# Patient Record
Sex: MALE | Race: WHITE | NOT HISPANIC OR LATINO | Employment: OTHER | URBAN - NONMETROPOLITAN AREA
[De-identification: names, ages, dates, MRNs, and addresses within clinical notes are randomized per-mention and may not be internally consistent; named-entity substitution may affect disease eponyms.]

---

## 2017-02-08 ENCOUNTER — AMBULATORY - GICH (OUTPATIENT)
Dept: SCHEDULING | Facility: OTHER | Age: 72
End: 2017-02-08

## 2018-01-17 PROBLEM — E78.5 HYPERLIPIDEMIA: Status: ACTIVE | Noted: 2018-01-17

## 2018-01-17 PROBLEM — G43.909 MIGRAINE HEADACHE: Status: ACTIVE | Noted: 2018-01-17

## 2018-01-17 PROBLEM — I25.10 ATHEROSCLEROTIC HEART DISEASE: Status: ACTIVE | Noted: 2018-01-17

## 2018-01-17 RX ORDER — NITROGLYCERIN 0.4 MG/1
0.4 TABLET SUBLINGUAL EVERY 5 MIN PRN
COMMUNITY
Start: 2016-07-29 | End: 2020-09-21

## 2018-01-17 RX ORDER — METOPROLOL TARTRATE 25 MG/1
12.5 TABLET, FILM COATED ORAL DAILY
COMMUNITY
Start: 2016-10-14 | End: 2018-09-26

## 2018-01-17 RX ORDER — DIPHENOXYLATE HYDROCHLORIDE AND ATROPINE SULFATE 2.5; .025 MG/1; MG/1
1 TABLET ORAL DAILY
COMMUNITY

## 2018-01-17 RX ORDER — METOPROLOL SUCCINATE 50 MG/1
50 TABLET, EXTENDED RELEASE ORAL DAILY
COMMUNITY
Start: 2016-07-29 | End: 2018-07-11

## 2018-01-17 RX ORDER — ASPIRIN 81 MG/1
81 TABLET, CHEWABLE ORAL DAILY
COMMUNITY
Start: 2016-10-14

## 2018-01-17 RX ORDER — ACETAMINOPHEN 325 MG/1
650 TABLET ORAL EVERY 4 HOURS PRN
COMMUNITY

## 2018-01-17 RX ORDER — ATORVASTATIN CALCIUM 40 MG/1
40 TABLET, FILM COATED ORAL DAILY
COMMUNITY
Start: 2016-10-05 | End: 2020-09-21

## 2018-01-17 RX ORDER — FUROSEMIDE 40 MG
40 TABLET ORAL DAILY
COMMUNITY
Start: 2016-11-07 | End: 2018-07-11

## 2018-02-02 ENCOUNTER — DOCUMENTATION ONLY (OUTPATIENT)
Dept: FAMILY MEDICINE | Facility: OTHER | Age: 73
End: 2018-02-02

## 2018-02-09 ENCOUNTER — TRANSFERRED RECORDS (OUTPATIENT)
Dept: HEALTH INFORMATION MANAGEMENT | Facility: OTHER | Age: 73
End: 2018-02-09

## 2018-02-27 ENCOUNTER — TRANSFERRED RECORDS (OUTPATIENT)
Dept: HEALTH INFORMATION MANAGEMENT | Facility: OTHER | Age: 73
End: 2018-02-27

## 2018-06-08 ENCOUNTER — DOCUMENTATION ONLY (OUTPATIENT)
Dept: OTHER | Facility: CLINIC | Age: 73
End: 2018-06-08

## 2018-06-08 PROBLEM — Z71.89 ACP (ADVANCE CARE PLANNING): Chronic | Status: ACTIVE | Noted: 2018-06-08

## 2018-07-11 ENCOUNTER — OFFICE VISIT (OUTPATIENT)
Dept: FAMILY MEDICINE | Facility: OTHER | Age: 73
End: 2018-07-11
Attending: FAMILY MEDICINE
Payer: COMMERCIAL

## 2018-07-11 VITALS
HEART RATE: 56 BPM | DIASTOLIC BLOOD PRESSURE: 60 MMHG | WEIGHT: 152 LBS | TEMPERATURE: 96.4 F | SYSTOLIC BLOOD PRESSURE: 120 MMHG | BODY MASS INDEX: 22.51 KG/M2

## 2018-07-11 DIAGNOSIS — R05.9 COUGH: Primary | ICD-10-CM

## 2018-07-11 PROCEDURE — G0463 HOSPITAL OUTPT CLINIC VISIT: HCPCS

## 2018-07-11 PROCEDURE — 99213 OFFICE O/P EST LOW 20 MIN: CPT | Performed by: FAMILY MEDICINE

## 2018-07-11 RX ORDER — METOPROLOL SUCCINATE 25 MG/1
1 TABLET, EXTENDED RELEASE ORAL DAILY
COMMUNITY
Start: 2018-04-11

## 2018-07-11 RX ORDER — MULTIVITAMIN WITH IRON
1 TABLET ORAL DAILY
COMMUNITY

## 2018-07-11 RX ORDER — CODEINE PHOSPHATE AND GUAIFENESIN 10; 100 MG/5ML; MG/5ML
1 SOLUTION ORAL EVERY 4 HOURS PRN
Qty: 120 ML | Refills: 3 | Status: SHIPPED | OUTPATIENT
Start: 2018-07-11 | End: 2018-09-26

## 2018-07-11 ASSESSMENT — PAIN SCALES - GENERAL: PAINLEVEL: MILD PAIN (3)

## 2018-07-11 NOTE — MR AVS SNAPSHOT
"              After Visit Summary   2018    Darrell Peterson    MRN: [id number]           Patient Information     Date Of Birth          1945        Visit Information        Provider Department      2018 10:00 AM Dariusz Flowers MD [facility]        Today's Diagnoses     Cough    -  1       Follow-ups after your visit        Who to contact     If you have questions or need follow up information about today's clinic visit or your schedule please contact [facility] directly at [phone].  Normal or non-critical lab and imaging results will be communicated to you by MyChart, letter or phone within 4 business days after the clinic has received the results. If you do not hear from us within 7 days, please contact the clinic through [employer] or phone. If you have a critical or abnormal lab result, we will notify you by phone as soon as possible.  Submit refill requests through [employer] or call your pharmacy and they will forward the refill request to us. Please allow 3 business days for your refill to be completed.          Additional Information About Your Visit        MyChart Information     [employer] lets you send messages to your doctor, view your test results, renew your prescriptions, schedule appointments and more. To sign up, go to [email or web address] . Click on \"Log in\" on the left side of the screen, which will take you to the Welcome page. Then click on \"Sign up Now\" on the right side of the page.     You will be asked to enter the access code listed below, as well as some personal information. Please follow the directions to create your username and password.     Your access code is: [email or web address]  Expires: 10/10/2018 12:50 PM     Your access code will  in 90 days. If you need help or a new code, please call your [facility] or [phone].        Care EveryWhere ID     This is your Care EveryWhere ID. This could be used by other " organizations to access your [place] medical records  [id number]        Your Vitals Were     Pulse Temperature BMI (Body Mass Index)             56 96.4  F (35.8  C) 22.51 kg/m2          Blood Pressure from Last 3 Encounters:   07/11/18 120/60   11/07/16 94/60   10/24/16 92/58    Weight from Last 3 Encounters:   07/11/18 152 lb (68.9 kg)   11/07/16 151 lb (68.5 kg)   10/24/16 152 lb 6.4 oz (69.1 kg)              Today, you had the following     No orders found for display         Today's Medication Changes          These changes are accurate as of 7/11/18 11:59 PM.  If you have any questions, ask your nurse or doctor.               Start taking these medicines.        Dose/Directions    guaiFENesin-codeine 100-10 MG/5ML Soln solution   Commonly known as:  ROBITUSSIN AC   Used for:  Cough   Started by:  Dariusz Flowers MD        Dose:  1 tsp.   Take 5 mLs by mouth every 4 hours as needed for cough   Quantity:  120 mL   Refills:  3         These medicines have changed or have updated prescriptions.        Dose/Directions    metoprolol succinate 25 MG 24 hr tablet   Commonly known as:  TOPROL-XL   This may have changed:  Another medication with the same name was removed. Continue taking this medication, and follow the directions you see here.   Changed by:  Dariusz Flowers MD        Dose:  1 tablet   Take 1 tablet by mouth daily   Refills:  0         Stop taking these medicines if you haven't already. Please contact your care team if you have questions.     furosemide 40 MG tablet   Commonly known as:  LASIX   Stopped by:  Dariusz Flowers MD                Where to get your medicines      Some of these will need a paper prescription and others can be bought over the counter.  Ask your nurse if you have questions.     Bring a paper prescription for each of these medications     guaiFENesin-codeine 100-10 MG/5ML Soln solution                Primary Care Provider Office Phone # Fax #    Dariusz Flowers MD  [phone]       [address]   [facility] [address]        Equal Access to Services     SEPIDEH CROWELL : Hadii aad ku hadashelyzhane Jef, yajaira melvinamalu, sariah kakrish ro, xavier christianin hayaajoe felixpanda lozanotyler arredondo. So [facility] [phone].    ATENCIÓN: Si habla español, tiene a ruvalcaba disposición servicios gratuitos de asistencia lingüística. Llame al [phone].    We comply with applicable federal civil rights laws and [place] laws. We do not discriminate on the basis of race, color, national origin, age, disability, sex, sexual orientation, or gender identity.            Thank you!     Thank you for choosing [facility] AND [facility]  for your care. Our goal is always to provide you with excellent care. Hearing back from our patients is one way we can continue to improve our services. Please take a few minutes to complete the written survey that you may receive in the mail after your visit with us. Thank you!             Your Updated Medication List - Protect others around you: Learn how to safely use, store and throw away your medicines at www.disposemymeds.org.          This list is accurate as of 7/11/18 11:59 PM.  Always use your most recent med list.                   Brand Name Dispense Instructions for use Diagnosis    acetaminophen 325 MG tablet    TYLENOL     Take 650 mg by mouth every 4 hours as needed        aspirin 81 MG chewable tablet      Take 81 mg by mouth daily        atorvastatin 40 MG tablet    LIPITOR     Take 40 mg by mouth daily        guaiFENesin-codeine 100-10 MG/5ML Soln solution    ROBITUSSIN AC    120 mL    Take 5 mLs by mouth every 4 hours as needed for cough    Cough       magnesium 250 MG tablet      Take 1 tablet by mouth daily        metoprolol succinate 25 MG 24 hr tablet    TOPROL-XL     Take 1 tablet by mouth daily        metoprolol tartrate 25 MG tablet    LOPRESSOR     Take 12.5 mg by mouth daily        MULTI-VITAMINS Tabs      Take 1 tablet  by mouth daily        nitroGLYcerin 0.4 MG sublingual tablet    NITROSTAT     Place 0.4 mg under the tongue every 5 minutes as needed        RA VITAMIN B-12 TR 1000 MCG Tbcr   Generic drug:  cyanocobalamin      Take 1 tablet by mouth daily        Selenium 200 MCG Tbcr

## 2018-07-11 NOTE — NURSING NOTE
Patient here for cold, cough and chest congestion for the 9 days. No Suarez LPN .......................7/11/2018  10:13 AM

## 2018-07-12 ASSESSMENT — ENCOUNTER SYMPTOMS: COUGH: 1

## 2018-07-12 NOTE — PROGRESS NOTES
SUBJECTIVE:   Darrell Peterson is a 73 year old male who presents to clinic today for the following health issues: Cough congestion    HPI Comments: Patient arrives here for cough and congestion.  He reports that he is on day 9.  Is been using cough drops Vicks essential oils all without any improvement although reports that the cough is now nonproductive and has become more dry.  He has felt warm but no overt fevers and chills.    Cough           Patient Active Problem List    Diagnosis Date Noted     Cough 07/11/2018     Priority: Medium     ACP (advance care planning) 06/08/2018     Priority: Medium     Atherosclerotic heart disease 01/17/2018     Priority: Medium     Hyperlipidemia 01/17/2018     Priority: Medium     Migraine headache 01/17/2018     Priority: Medium     Chronic systolic congestive heart failure (H) 11/07/2016     Priority: Medium     Coronary artery disease involving coronary bypass graft of native heart without angina pectoris 10/26/2016     Priority: Medium     Overview:   Three-vessel       Pleural effusion 10/26/2016     Priority: Medium     Cerebral infarction (H) 02/21/2014     Priority: Medium     Slowing of urinary stream 02/21/2014     Priority: Medium     Past Medical History:   Diagnosis Date     Atherosclerotic heart disease of native coronary artery without angina pectoris     1996,Stents times 3     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      Past Surgical History:   Procedure Laterality Date     ANGIOPLASTY      9/96, 1996~1997,and angioplasty     COLONOSCOPY      6/4/09     COLONOSCOPY      1997 Nov     HEART CATH, ANGIOPLASTY      9/96, 1996~1997,x3     HEART CATH, ANGIOPLASTY      1996     OTHER SURGICAL HISTORY      9/96, 1996~1997,[id number],SCAN-ANGIOGRAM,Coronary angiography     OTHER SURGICAL HISTORY      1996,[id number],SCAN-ANGIOGRAM,Coronary arteriography, follow-up angiography in Nov     OTHER SURGICAL HISTORY      10/10/2016,[id number],OTHER,N/A,[facility]      VASECTOMY      1982     Current Outpatient Prescriptions   Medication Sig Dispense Refill     acetaminophen (TYLENOL) 325 MG tablet Take 650 mg by mouth every 4 hours as needed       aspirin 81 MG chewable tablet Take 81 mg by mouth daily       atorvastatin (LIPITOR) 40 MG tablet Take 40 mg by mouth daily       cyanocobalamin (RA VITAMIN B-12 TR) 1000 MCG TBCR Take 1 tablet by mouth daily       guaiFENesin-codeine (ROBITUSSIN AC) 100-10 MG/5ML SOLN solution Take 5 mLs by mouth every 4 hours as needed for cough 120 mL 3     magnesium 250 MG tablet Take 1 tablet by mouth daily       metoprolol succinate (TOPROL-XL) 25 MG 24 hr tablet Take 1 tablet by mouth daily       metoprolol tartrate (LOPRESSOR) 25 MG tablet Take 12.5 mg by mouth daily       Multiple Vitamin (MULTI-VITAMINS) TABS Take 1 tablet by mouth daily       nitroGLYcerin (NITROSTAT) 0.4 MG sublingual tablet Place 0.4 mg under the tongue every 5 minutes as needed       Selenium 200 MCG TBCR        No Known Allergies    Review of Systems   Respiratory: Positive for cough.         OBJECTIVE:     /60 (BP Location: Right arm, Patient Position: Sitting)  Pulse 56  Temp 96.4  F (35.8  C)  Wt 152 lb (68.9 kg)  BMI 22.51 kg/m2  Body mass index is 22.51 kg/(m^2).  Physical Exam   Constitutional: He appears well-developed.   HENT:   Right Ear: External ear normal.   Left Ear: External ear normal.   Mouth/Throat: No oropharyngeal exudate.   Cardiovascular: Normal rate and normal heart sounds.    Pulmonary/Chest: Effort normal and breath sounds normal.       none     ASSESSMENT/PLAN:         1. Cough  I suspect that this cough is viral in origin.  Recommended observation through the weekend.  If no improvement call for an antibiotic.  Will start Robitussin.  - guaiFENesin-codeine (ROBITUSSIN AC) 100-10 MG/5ML SOLN solution; Take 5 mLs by mouth every 4 hours as needed for cough  Dispense: 120 mL; Refill: 3      Dariusz Flowers MD  [facility] [place] AND  [facility]

## 2018-07-18 ENCOUNTER — TELEPHONE (OUTPATIENT)
Dept: FAMILY MEDICINE | Facility: OTHER | Age: 73
End: 2018-07-18

## 2018-07-18 DIAGNOSIS — R05.9 COUGH: Primary | ICD-10-CM

## 2018-07-18 RX ORDER — AMOXICILLIN 500 MG/1
500 CAPSULE ORAL 3 TIMES DAILY
Qty: 30 CAPSULE | Refills: 0 | Status: SHIPPED | OUTPATIENT
Start: 2018-07-18 | End: 2018-09-26

## 2018-07-18 NOTE — TELEPHONE ENCOUNTER
Patient was seen on 0711/18, was told to call if not better for antibiotic. No Suarez LPN .......................7/18/2018  11:46 AM

## 2018-09-26 ENCOUNTER — OFFICE VISIT (OUTPATIENT)
Dept: FAMILY MEDICINE | Facility: OTHER | Age: 73
End: 2018-09-26
Attending: FAMILY MEDICINE
Payer: COMMERCIAL

## 2018-09-26 VITALS
HEART RATE: 60 BPM | DIASTOLIC BLOOD PRESSURE: 70 MMHG | HEIGHT: 70 IN | SYSTOLIC BLOOD PRESSURE: 138 MMHG | WEIGHT: 152.6 LBS | BODY MASS INDEX: 21.85 KG/M2 | TEMPERATURE: 97.2 F

## 2018-09-26 DIAGNOSIS — M54.42 ACUTE BILATERAL LOW BACK PAIN WITH LEFT-SIDED SCIATICA: Primary | ICD-10-CM

## 2018-09-26 DIAGNOSIS — I25.810 CORONARY ARTERY DISEASE INVOLVING CORONARY BYPASS GRAFT OF NATIVE HEART WITHOUT ANGINA PECTORIS: ICD-10-CM

## 2018-09-26 DIAGNOSIS — E78.5 HYPERLIPIDEMIA, UNSPECIFIED HYPERLIPIDEMIA TYPE: ICD-10-CM

## 2018-09-26 PROBLEM — R05.9 COUGH: Status: RESOLVED | Noted: 2018-07-11 | Resolved: 2018-09-26

## 2018-09-26 PROBLEM — M54.40 ACUTE BILATERAL LOW BACK PAIN WITH SCIATICA: Status: ACTIVE | Noted: 2018-09-26

## 2018-09-26 PROCEDURE — 99213 OFFICE O/P EST LOW 20 MIN: CPT | Performed by: FAMILY MEDICINE

## 2018-09-26 PROCEDURE — G0463 HOSPITAL OUTPT CLINIC VISIT: HCPCS

## 2018-09-26 RX ORDER — ETODOLAC 500 MG
500 TABLET ORAL 2 TIMES DAILY
Qty: 20 TABLET | Refills: 0 | Status: SHIPPED | OUTPATIENT
Start: 2018-09-26 | End: 2018-10-10

## 2018-09-26 ASSESSMENT — ENCOUNTER SYMPTOMS: BACK PAIN: 1

## 2018-09-26 ASSESSMENT — PAIN SCALES - GENERAL: PAINLEVEL: MODERATE PAIN (5)

## 2018-09-26 NOTE — MR AVS SNAPSHOT
"              After Visit Summary   9/26/2018    Darrell Peterson    MRN: [id number]           Patient Information     Date Of Birth          1945        Visit Information        Provider Department      9/26/2018 8:15 AM Dariusz Flowers MD [facility]        Today's Diagnoses     Acute bilateral low back pain with left-sided sciatica    -  1    Coronary artery disease involving coronary bypass graft of native heart without angina pectoris        Hyperlipidemia, unspecified hyperlipidemia type           Follow-ups after your visit        Future tests that were ordered for you today     Open Future Orders        Priority Expected Expires Ordered    XR Lumbar Spine 2/3 Views Routine 10/3/2018 9/26/2019 9/26/2018            Who to contact     If you have questions or need follow up information about today's clinic visit or your schedule please contact [facility] [place] directly at [phone].  Normal or non-critical lab and imaging results will be communicated to you by MyChart, letter or phone within 4 business days after the clinic has received the results. If you do not hear from us within 7 days, please contact the clinic through MyChart or phone. If you have a critical or abnormal lab result, we will notify you by phone as soon as possible.  Submit refill requests through [employer] or call your pharmacy and they will forward the refill request to us. Please allow 3 business days for your refill to be completed.          Additional Information About Your Visit        Care EveryWhere ID     This is your Care EveryWhere ID. This could be used by other organizations to access your [place] medical records  [id number]        Your Vitals Were     Pulse Temperature Height BMI (Body Mass Index)          60 97.2  F (36.2  C) (Tympanic) 5' 9.75\" (1.772 m) 22.05 kg/m2         Blood Pressure from Last 3 Encounters:   09/26/18 138/70   07/11/18 120/60   11/07/16 94/60    Weight from " Last 3 Encounters:   09/26/18 152 lb 9.6 oz (69.2 kg)   07/11/18 152 lb (68.9 kg)   11/07/16 151 lb (68.5 kg)                 Today's Medication Changes          These changes are accurate as of 9/26/18 12:10 PM.  If you have any questions, ask your nurse or doctor.               Start taking these medicines.        Dose/Directions    etodolac 500 MG tablet   Commonly known as:  LODINE   Used for:  Acute bilateral low back pain with left-sided sciatica   Started by:  Dariusz Flowers MD        Dose:  500 mg   Take 1 tablet (500 mg) by mouth 2 times daily   Quantity:  20 tablet   Refills:  0            Where to get your medicines      These medications were sent to [facility] - [place] - [address]  [address] [place] [address]     Phone:  [phone]     etodolac 500 MG tablet                Primary Care Provider Office Phone # Fax #    Dariusz Flowers [phone]       [address] [facility] [address]        Equal Access to Services     [facility]: Hadii katie ku hadasho Soriddhi, waaxda luqadaha, qaybta kaalmada jia, xavier cannon . So [facility] [phone].    ATENCIÓN: Si habla español, tiene a ruvalcaba disposición servicios gratuitos de asistencia lingüística. LlSt. [facility] [phone].    We comply with applicable federal civil rights laws and [place] laws. We do not discriminate on the basis of race, color, national origin, age, disability, sex, sexual orientation, or gender identity.            Thank you!     Thank you for choosing [facility] AND [facility]  for your care. Our goal is always to provide you with excellent care. Hearing back from our patients is one way we can continue to improve our services. Please take a few minutes to complete the written survey that you may receive in the mail after your visit with us. Thank you!             Your Updated Medication List - Protect others  around you: Learn how to safely use, store and throw away your medicines at www.disposemymeds.org.          This list is accurate as of 9/26/18 12:10 PM.  Always use your most recent med list.                   Brand Name Dispense Instructions for use Diagnosis    acetaminophen 325 MG tablet    TYLENOL     Take 650 mg by mouth every 4 hours as needed        aspirin 81 MG chewable tablet      Take 81 mg by mouth daily        atorvastatin 40 MG tablet    LIPITOR     Take 40 mg by mouth daily        etodolac 500 MG tablet    LODINE    20 tablet    Take 1 tablet (500 mg) by mouth 2 times daily    Acute bilateral low back pain with left-sided sciatica       magnesium 250 MG tablet      Take 1 tablet by mouth daily        metoprolol succinate 25 MG 24 hr tablet    TOPROL-XL     Take 1 tablet by mouth daily        MULTI-VITAMINS Tabs      Take 1 tablet by mouth daily        nitroGLYcerin 0.4 MG sublingual tablet    NITROSTAT     Place 0.4 mg under the tongue every 5 minutes as needed        RA VITAMIN B-12 TR 1000 MCG Tbcr   Generic drug:  cyanocobalamin      Take 1 tablet by mouth daily        Selenium 200 MCG Tbcr

## 2018-09-26 NOTE — NURSING NOTE
"Patient presents in the clinic with concerns of a low back pain that radiates down his left leg, that began around 2 months ago. Patient denies any injury, but believes it is caused from overworking his back. Patient would also like lab work today, and is fasting.  Rosibel Sr LPN 9/26/2018 8:16 AM  Chief Complaint   Patient presents with     Back Pain       Initial /70 (BP Location: Right arm, Patient Position: Sitting, Cuff Size: Adult Regular)  Pulse 60  Temp 97.2  F (36.2  C) (Tympanic)  Ht 5' 9.75\" (1.772 m)  Wt 152 lb 9.6 oz (69.2 kg)  BMI 22.05 kg/m2 Estimated body mass index is 22.05 kg/(m^2) as calculated from the following:    Height as of this encounter: 5' 9.75\" (1.772 m).    Weight as of this encounter: 152 lb 9.6 oz (69.2 kg).  Medication Reconciliation: complete    Joseline Sr LPN    "

## 2018-10-02 ENCOUNTER — TELEPHONE (OUTPATIENT)
Dept: FAMILY MEDICINE | Facility: OTHER | Age: 73
End: 2018-10-02

## 2018-10-04 NOTE — TELEPHONE ENCOUNTER
Patients EC was inquiring about his last visit, unfortunately he has night signed a form stating they have access. She was made aware, he can fill one out at [facility].    Claudia King LPN on 10/4/2018 at 9:05 AM

## 2018-10-10 ENCOUNTER — HOSPITAL ENCOUNTER (OUTPATIENT)
Dept: GENERAL RADIOLOGY | Facility: OTHER | Age: 73
Discharge: HOME | End: 2018-10-10
Attending: FAMILY MEDICINE | Admitting: FAMILY MEDICINE
Payer: COMMERCIAL

## 2018-10-10 ENCOUNTER — OFFICE VISIT (OUTPATIENT)
Dept: FAMILY MEDICINE | Facility: OTHER | Age: 73
End: 2018-10-10
Attending: FAMILY MEDICINE
Payer: COMMERCIAL

## 2018-10-10 VITALS
HEART RATE: 56 BPM | DIASTOLIC BLOOD PRESSURE: 64 MMHG | SYSTOLIC BLOOD PRESSURE: 130 MMHG | WEIGHT: 151 LBS | BODY MASS INDEX: 21.82 KG/M2

## 2018-10-10 DIAGNOSIS — M54.42 ACUTE BILATERAL LOW BACK PAIN WITH LEFT-SIDED SCIATICA: ICD-10-CM

## 2018-10-10 DIAGNOSIS — M54.42 ACUTE BILATERAL LOW BACK PAIN WITH LEFT-SIDED SCIATICA: Primary | ICD-10-CM

## 2018-10-10 PROCEDURE — 99213 OFFICE O/P EST LOW 20 MIN: CPT | Performed by: FAMILY MEDICINE

## 2018-10-10 PROCEDURE — G0463 HOSPITAL OUTPT CLINIC VISIT: HCPCS

## 2018-10-10 PROCEDURE — G0463 HOSPITAL OUTPT CLINIC VISIT: HCPCS | Mod: 25

## 2018-10-10 PROCEDURE — 72100 X-RAY EXAM L-S SPINE 2/3 VWS: CPT

## 2018-10-10 ASSESSMENT — PAIN SCALES - GENERAL: PAINLEVEL: MODERATE PAIN (5)

## 2018-10-10 NOTE — PROGRESS NOTES
SUBJECTIVE:   Darrell Peterson is a 73 year old male who presents to clinic today for the following health issues: Follow leg pain    HPI Comments: Patient arrives here for follow-up left leg pain.  Spin going about 8 weeks.  Seems to have stabilized.  He continues to go to chiropractic care but also is wondering about pursuing physical therapy.  Starts in the buttocks goes down the back of the leg to the side.  He also reports his metatarsal pads are hurting.  Rates it at times 5 out of 10.  No changes in bowel or bladder habits.  He does report a little leg weakness.  States he walks with a limp    Patient would also like a PSA done.    Musculoskeletal Problem           Patient Active Problem List    Diagnosis Date Noted     Acute bilateral low back pain with sciatica 09/26/2018     Priority: Medium     ACP (advance care planning) 06/08/2018     Priority: Medium     Atherosclerotic heart disease 01/17/2018     Priority: Medium     Hyperlipidemia 01/17/2018     Priority: Medium     Migraine headache 01/17/2018     Priority: Medium     Chronic systolic congestive heart failure (H) 11/07/2016     Priority: Medium     Coronary artery disease involving coronary bypass graft of native heart without angina pectoris 10/26/2016     Priority: Medium     Overview:   Three-vessel       Cerebral infarction (H) 02/21/2014     Priority: Medium     Slowing of urinary stream 02/21/2014     Priority: Medium     Past Medical History:   Diagnosis Date     Atherosclerotic heart disease of native coronary artery without angina pectoris     1996,Stents times 3     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      Past Surgical History:   Procedure Laterality Date     ANGIOPLASTY      9/96, 1996~1997,and angioplasty     COLONOSCOPY      6/4/09     COLONOSCOPY      1997 Nov     HEART CATH, ANGIOPLASTY      9/96, 1996~1997,x3     HEART CATH, ANGIOPLASTY      1996     OTHER SURGICAL HISTORY      9/96,  1996~1997,[id number],SCAN-ANGIOGRAM,Coronary angiography     OTHER SURGICAL HISTORY      1996,[id number],SCAN-ANGIOGRAM,Coronary arteriography, follow-up angiography in Nov     OTHER SURGICAL HISTORY      10/10/2016,[id number],OTHER,N/A,[facility]     VASECTOMY      1982     Current Outpatient Prescriptions   Medication Sig Dispense Refill     acetaminophen (TYLENOL) 325 MG tablet Take 650 mg by mouth every 4 hours as needed       aspirin 81 MG chewable tablet Take 81 mg by mouth daily       atorvastatin (LIPITOR) 40 MG tablet Take 40 mg by mouth daily       cyanocobalamin (RA VITAMIN B-12 TR) 1000 MCG TBCR Take 1 tablet by mouth daily       magnesium 250 MG tablet Take 1 tablet by mouth daily       metoprolol succinate (TOPROL-XL) 25 MG 24 hr tablet Take 1 tablet by mouth daily       Multiple Vitamin (MULTI-VITAMINS) TABS Take 1 tablet by mouth daily       nitroGLYcerin (NITROSTAT) 0.4 MG sublingual tablet Place 0.4 mg under the tongue every 5 minutes as needed       Selenium 200 MCG TBCR        No Known Allergies    Review of Systems     OBJECTIVE:     /64 (BP Location: Right arm, Patient Position: Sitting)  Pulse 56  Wt 151 lb (68.5 kg)  BMI 21.82 kg/m2  Body mass index is 21.82 kg/(m^2).  Physical Exam   Constitutional: He appears well-developed.   Neck: Normal range of motion.   Pulmonary/Chest: Effort normal.   Genitourinary:   Genitourinary Comments: Declines prostate exam   Neurological: He is alert.   Psychiatric: He has a normal mood and affect.     X-rays show some degenerative changes  none     ASSESSMENT/PLAN:         1. Acute bilateral low back pain with left-sided sciatica  Patient probably has sciatica going down the left leg.  He has not started his Lodine.  We discussed further options including injections.  Possibly prednisone.  Possibly further studying including MRI.  But at this time patient does not wish to pursue injections.  We will continue to be conservative.  Physical f therapy  ordered.  Also went into an extensive conversation with his wife concerning PSA  And the difficulty with false positives.  At this time they do not want to pursue it.  - XR Lumbar Spine 2/3 Views; Future  - PHYSICAL THERAPY REFERRAL; Future      Dariusz Flowers MD  [facility]

## 2018-10-10 NOTE — MR AVS SNAPSHOT
After Visit Summary   10/10/2018    Darrell Peterson    MRN: [id number]           Patient Information     Date Of Birth          1945        Visit Information        Provider Department      10/10/2018 8:30 AM Dariusz Flowers MD [facility]        Today's Diagnoses     Acute bilateral low back pain with left-sided sciatica    -  1       Follow-ups after your visit        Additional Services     PHYSICAL THERAPY REFERRAL       Pt will call  If he decides to start phys therapy                  Future tests that were ordered for you today     Open Future Orders        Priority Expected Expires Ordered    PHYSICAL THERAPY REFERRAL Routine  10/10/2019 10/10/2018    XR Lumbar Spine 2/3 Views Routine 10/10/2018 10/10/2019 10/10/2018            Who to contact     If you have questions or need follow up information about today's clinic visit or your schedule please contact [facility] directly at [phone].  Normal or non-critical lab and imaging results will be communicated to you by MyChart, letter or phone within 4 business days after the clinic has received the results. If you do not hear from us within 7 days, please contact the clinic through MyChart or phone. If you have a critical or abnormal lab result, we will notify you by phone as soon as possible.  Submit refill requests through "[employer] or call your pharmacy and they will forward the refill request to us. Please allow 3 business days for your refill to be completed.          Additional Information About Your Visit        Care EveryWhere ID     This is your Care EveryWhere ID. This could be used by other organizations to access your [facility] medical records  [id number]        Your Vitals Were     Pulse BMI (Body Mass Index)                56 21.82 kg/m2           Blood Pressure from Last 3 Encounters:   10/10/18 130/64   09/26/18 138/70   07/11/18 120/60    Weight from Last 3 Encounters:   10/10/18  151 lb (68.5 kg)   09/26/18 152 lb 9.6 oz (69.2 kg)   07/11/18 152 lb (68.9 kg)               Primary Care Provider Office Phone # Fax #    Dariusz Flowers [phone] [address]  [facility] [id number]        Equal Access to Services     [facility]: Hadii aad ku hadasho Soomaali, waaxda luqadaha, qaybta kaalmada adeegyada, waxay idiin hayaan adeeg martatyler lasheryln . So [facility] [phone].    ATENCIÓN: Si habla español, tiene a ruvalcaba disposición servicios gratuitos de asistencia lingüística. Saviame al [phone].    We comply with applicable federal civil rights laws and [place] laws. We do not discriminate on the basis of race, color, national origin, age, disability, sex, sexual orientation, or gender identity.            Thank you!     Thank you for choosing [facility] AND [facility]  for your care. Our goal is always to provide you with excellent care. Hearing back from our patients is one way we can continue to improve our services. Please take a few minutes to complete the written survey that you may receive in the mail after your visit with us. Thank you!             Your Updated Medication List - Protect others around you: Learn how to safely use, store and throw away your medicines at www.disposemymeds.org.          This list is accurate as of 10/10/18  2:04 PM.  Always use your most recent med list.                   Brand Name Dispense Instructions for use Diagnosis    acetaminophen 325 MG tablet    TYLENOL     Take 650 mg by mouth every 4 hours as needed        aspirin 81 MG chewable tablet      Take 81 mg by mouth daily        atorvastatin 40 MG tablet    LIPITOR     Take 40 mg by mouth daily        magnesium 250 MG tablet      Take 1 tablet by mouth daily        metoprolol succinate 25 MG 24 hr tablet    TOPROL-XL     Take 1 tablet by mouth daily        MULTI-VITAMINS Tabs      Take 1 tablet by mouth daily        nitroGLYcerin 0.4 MG sublingual tablet     NITROSTAT     Place 0.4 mg under the tongue every 5 minutes as needed        RA VITAMIN B-12 TR 1000 MCG Tbcr   Generic drug:  cyanocobalamin      Take 1 tablet by mouth daily        Selenium 200 MCG Tbcr

## 2019-02-20 ENCOUNTER — OFFICE VISIT (OUTPATIENT)
Dept: FAMILY MEDICINE | Facility: OTHER | Age: 74
End: 2019-02-20
Attending: FAMILY MEDICINE
Payer: COMMERCIAL

## 2019-02-20 VITALS
WEIGHT: 157 LBS | TEMPERATURE: 97.7 F | SYSTOLIC BLOOD PRESSURE: 130 MMHG | BODY MASS INDEX: 22.48 KG/M2 | OXYGEN SATURATION: 98 % | HEIGHT: 70 IN | HEART RATE: 75 BPM | DIASTOLIC BLOOD PRESSURE: 70 MMHG

## 2019-02-20 DIAGNOSIS — I25.10 ATHEROSCLEROSIS OF NATIVE CORONARY ARTERY OF NATIVE HEART WITHOUT ANGINA PECTORIS: ICD-10-CM

## 2019-02-20 DIAGNOSIS — Z01.818 PREOPERATIVE EXAMINATION: Primary | ICD-10-CM

## 2019-02-20 DIAGNOSIS — H25.13 AGE-RELATED NUCLEAR CATARACT OF BOTH EYES: ICD-10-CM

## 2019-02-20 PROBLEM — H25.10 AGE-RELATED NUCLEAR CATARACT: Status: ACTIVE | Noted: 2019-02-20

## 2019-02-20 LAB
ANION GAP SERPL CALCULATED.3IONS-SCNC: 6 MMOL/L (ref 3–14)
BUN SERPL-MCNC: 18 MG/DL (ref 7–25)
CALCIUM SERPL-MCNC: 9.1 MG/DL (ref 8.6–10.3)
CHLORIDE SERPL-SCNC: 105 MMOL/L (ref 98–107)
CO2 SERPL-SCNC: 30 MMOL/L (ref 21–31)
CREAT SERPL-MCNC: 1.05 MG/DL (ref 0.7–1.3)
GFR SERPL CREATININE-BSD FRML MDRD: 69 ML/MIN/{1.73_M2}
GLUCOSE SERPL-MCNC: 87 MG/DL (ref 70–105)
POTASSIUM SERPL-SCNC: 4.3 MMOL/L (ref 3.5–5.1)
SODIUM SERPL-SCNC: 141 MMOL/L (ref 134–144)

## 2019-02-20 PROCEDURE — 99214 OFFICE O/P EST MOD 30 MIN: CPT | Performed by: FAMILY MEDICINE

## 2019-02-20 PROCEDURE — 36415 COLL VENOUS BLD VENIPUNCTURE: CPT | Performed by: FAMILY MEDICINE

## 2019-02-20 PROCEDURE — G0463 HOSPITAL OUTPT CLINIC VISIT: HCPCS | Mod: 25

## 2019-02-20 PROCEDURE — 93010 ELECTROCARDIOGRAM REPORT: CPT | Performed by: INTERNAL MEDICINE

## 2019-02-20 PROCEDURE — 80048 BASIC METABOLIC PNL TOTAL CA: CPT | Performed by: FAMILY MEDICINE

## 2019-02-20 PROCEDURE — 93005 ELECTROCARDIOGRAM TRACING: CPT | Performed by: FAMILY MEDICINE

## 2019-02-20 ASSESSMENT — MIFFLIN-ST. JEOR: SCORE: 1454.43

## 2019-02-20 NOTE — PROGRESS NOTES
"----------------- PREOPERATIVE EXAM ------------------  2/20/2019    SUBJECTIVE:  Darrell Peterson is a 73 year old male here for preoperative optimization.    I was asked to see Darrell Peterson by Dr. Agrawal  for preoperative evaluation    Date of Surgery: 03/05 and 03/20  Type of Surgery: [facility]:  [facility]    HPI:  Patient arrives here for preop.  He will be undergoing left cataract surgery March 5 and right cataract surgery March 20.  Patient reports 2-year history of trouble seeing especially at night.  He states driving is getting more more complicated.  Patient does have a history of coronary artery disease and bypass graft but states he exercises regularly.  He has no difficulty with lifting weights sit ups push-ups on a regular basis.  No complaints of chest pain.      Nursing Notes:   No Abebe LPN  2/20/2019  2:33 PM  Sign at exiting of workspace  Date of Surgery: 3/5/19 and 3/20/19   Type of Surgery: cataract removal  Surgeon: [facility]   Hospital:  [place]  Fax:     Fever/Chills or other infectious symptoms in past month: no  >10lb weight loss in past two months: no      Health Care Directive/Code status:  YES  Hx of blood transfusions:   yes  Td up to date:  no  History of VRE/MRSA:  no Date:     Preoperative Evaluation: Obstructive Sleep Apnea screening    S: Snore -  Do you snore loudly? (louder than talking or loud enough to be heard through closed doors)yes  T: Tired - Do you often feel tired, fatigued, or sleepy during the daytime?no  O: Observed - Has anyone ever observed you stop breathing during your sleep?no  P: Pressure - Do you have or are you being treated for high blood pressure?no  B: BMI - BMI greater than 35kg/m2?no  A: Age - Age over 50 years old?yes  N: Neck - Neck circumference greater than 40 cm?no  G: Gender - Gender: Male?yes    Total number of \"YES\" responses:  2    Scoring: Low risk of OJSÉ MANUEL 0-2  At Risk of JOSÉ MANUEL: >3 High Risk of JOSÉ MANUEL: 5-8    No LMP for male " patient.  Medication Reconciliation: complete    No Abebe LPN  2019 2:24 PM        Patient Active Problem List    Diagnosis Date Noted     Age-related nuclear cataract 2019     Priority: Medium     Acute bilateral low back pain with sciatica 2018     Priority: Medium     ACP (advance care planning) 2018     Priority: Medium     Atherosclerotic heart disease 2018     Priority: Medium     Hyperlipidemia 2018     Priority: Medium     Migraine headache 2018     Priority: Medium     Chronic systolic congestive heart failure (H) 2016     Priority: Medium     Coronary artery disease involving coronary bypass graft of native heart without angina pectoris 10/26/2016     Priority: Medium     Overview:   Three-vessel       Cerebral infarction (H) 2014     Priority: Medium     Slowing of urinary stream 2014     Priority: Medium       Past Medical History:   Diagnosis Date     Atherosclerotic heart disease of native coronary artery without angina pectoris     ,Stents times 3     Other specified arthropod-borne viral fevers     2011       Past Surgical History:   Procedure Laterality Date     ANGIOPLASTY      , ~,and angioplasty     COLONOSCOPY  2009     COLONOSCOPY       Nov     HEART CATH, ANGIOPLASTY      , ,x3     HEART CATH, ANGIOPLASTY           OTHER SURGICAL HISTORY      , ,[id number],SCAN-ANGIOGRAM,Coronary angiography     OTHER SURGICAL HISTORY      ,2074,SCAN-ANGIOGRAM,Coronary arteriography, follow-up angiography in Nov     OTHER SURGICAL HISTORY      10/10/2016,[id number],OTHER,N/A,[facility]     VASECTOMY      1982       Family History   Problem Relation Age of Onset     Other - See Comments Mother          at 99     Other - See Comments Father         COPD, hx of smoking     Other - See Comments Brother          of stabbing     Other - See Comments Brother         no  "heart problems or hyperlipidemia     Other - See Comments Sister      Hyperlipidemia Sister         Hyperlipidemia,70 yrs old, hyperlipidemia     Heart Disease Other         Heart Disease,MI       Social History     Tobacco Use     Smoking status: Former Smoker     Types: Cigarettes     Last attempt to quit: 1977     Years since quittin.0     Smokeless tobacco: Never Used   Substance Use Topics     Alcohol use: No     Alcohol/week: 0.0 oz     Drug use: No       Current Outpatient Medications   Medication Sig Dispense Refill     acetaminophen (TYLENOL) 325 MG tablet Take 650 mg by mouth every 4 hours as needed       aspirin 81 MG chewable tablet Take 81 mg by mouth daily       atorvastatin (LIPITOR) 40 MG tablet Take 40 mg by mouth daily       cyanocobalamin (RA VITAMIN B-12 TR) 1000 MCG TBCR Take 1 tablet by mouth daily       magnesium 250 MG tablet Take 1 tablet by mouth daily       metoprolol succinate (TOPROL-XL) 25 MG 24 hr tablet Take 1 tablet by mouth daily       Multiple Vitamin (MULTI-VITAMINS) TABS Take 1 tablet by mouth daily       nitroGLYcerin (NITROSTAT) 0.4 MG sublingual tablet Place 0.4 mg under the tongue every 5 minutes as needed       Selenium 200 MCG TBCR          Allergies:  No Known Allergies    ROS:    Surgical:  patient denies previous complications from prior surgeries including but not limited to prolonged bleeding, anesthesia complications, dysrhythmias, surgical wound infections, or prolonged hospital stay.    Denies family hx of bleeding tendencies, anesthesia complications, or other problems with surgery.  For complete review of systems please see scanned reports    For complete review of systems please see scanned reports   -------------------------------------------------------------    PHYSICAL EXAM:  /70 (BP Location: Right arm, Patient Position: Sitting, Cuff Size: Adult Regular)   Pulse 75   Temp 97.7  F (36.5  C)   Ht 1.772 m (5' 9.75\")   Wt 71.2 kg (157 lb)   " SpO2 98%   BMI 22.69 kg/m      EXAM:  General Appearance: Pleasant, alert, appropriate appearance for age. No acute distress  Head Exam: Normal. Normocephalic, atraumatic.  Eyes: PERRL, EOMI  Ears: Normal TM's bilaterally. Normal auditory canals and external ears.   OroPharynx: Normal buccal mucosa. Normal pharynx.  Neck: Supple, no masses or nodes, no lymphadenopathy.  No thyromegaly.  Lungs: Normal chest wall and respirations. Clear to auscultation, no wheezes or crackles.  Cardiovascular: Regular rate and rhythm. S1, S2, no murmurs.  Gastrointestinal: Soft, nontender, no abnormal masses or organomegaly. BS normal   Musculoskeletal: No edema.  Skin: no concerning or new rashes.  Neurologic Exam: CN 2-12 grossly intact.  Normal gait.  Symmetric DTRs, normal gross motor movement, tone, and coordination. No tremor.  Psychiatric Exam: Alert and oriented, appropriate affect.      EKG:  sinus bradycardia with marked changes in the lateral leads no recent EKGs except from 2016 prior to his CABG .  ---------------------------------------------------------------  LABS    ASSESSEMENT AND PLAN:    (Z01.818) Preoperative examination  (primary encounter diagnosis)  Patient is medically cleared to proceed with surgery    (I25.10) Atherosclerosis of native coronary artery of native heart without angina pectoris  Currently no symptoms of angina EKG changes likely due to CABG    (H25.13) Age-related nuclear cataract of both eyes        PRE OP RECOMMENDATIONS:  Patient is on chronic pain medications no   Patient is on antiplatlet/anticoagulation medication asa stop one week pror  Other medications that need adjustment perioperatively none     Other:  Patient was advised to call our office and the surgical services with any change in condition or new symptoms if they were to develop between today and their surgical date.  Especially any cardiopulmonary symptoms or symptoms concerning for an infection.    Dariusz Flowers  2/20/2019

## 2019-02-20 NOTE — LETTER
February 22, 2019      Darrell DAVID Nicholas  [address]   [place] [address]        Dear ,    We are writing to inform you of your test results.    Your test results fall within the expected range(s) or remain unchanged from previous results.  Please continue with current treatment plan.    Resulted Orders   Basic Metabolic Panel   Result Value Ref Range    Sodium 141 134 - 144 mmol/L    Potassium 4.3 3.5 - 5.1 mmol/L    Chloride 105 98 - 107 mmol/L    Carbon Dioxide 30 21 - 31 mmol/L    Anion Gap 6 3 - 14 mmol/L    Glucose 87 70 - 105 mg/dL    Urea Nitrogen 18 7 - 25 mg/dL    Creatinine 1.05 0.70 - 1.30 mg/dL    GFR Estimate 69 >60 mL/min/[1.73_m2]    GFR Estimate If Black 84 >60 mL/min/[1.73_m2]    Calcium 9.1 8.6 - 10.3 mg/dL       If you have any questions or concerns, please call the clinic at the number listed above.       Sincerely,        Dariusz Flowers MD

## 2019-02-20 NOTE — NURSING NOTE
"Date of Surgery: 3/5/19 and 3/20/19   Type of Surgery: cataract removal  Surgeon: [facility]:  [place]  Fax:     Fever/Chills or other infectious symptoms in past month: no  >10lb weight loss in past two months: no  O2 SAT: 98    Health Care Directive/Code status:  YES  Hx of blood transfusions:   yes  Td up to date:  no  History of VRE/MRSA:  no Date:     Preoperative Evaluation: Obstructive Sleep Apnea screening    S: Snore -  Do you snore loudly? (louder than talking or loud enough to be heard through closed doors)yes  T: Tired - Do you often feel tired, fatigued, or sleepy during the daytime?no  O: Observed - Has anyone ever observed you stop breathing during your sleep?no  P: Pressure - Do you have or are you being treated for high blood pressure?no  B: BMI - BMI greater than 35kg/m2?no  A: Age - Age over 50 years old?yes  N: Neck - Neck circumference greater than 40 cm?no  G: Gender - Gender: Male?yes    Total number of \"YES\" responses:  2    Scoring: Low risk of JOSÉ MANUEL 0-2  At Risk of JOSÉ MANUEL: >3 High Risk of JOSÉ MANUEL: 5-8    No LMP for male patient.  Medication Reconciliation: complete    No Abebe LPN  2/20/2019 2:24 PM      "

## 2019-09-18 ENCOUNTER — TELEPHONE (OUTPATIENT)
Dept: FAMILY MEDICINE | Facility: OTHER | Age: 74
End: 2019-09-18

## 2019-09-18 ENCOUNTER — OFFICE VISIT (OUTPATIENT)
Dept: FAMILY MEDICINE | Facility: OTHER | Age: 74
End: 2019-09-18
Attending: FAMILY MEDICINE
Payer: COMMERCIAL

## 2019-09-18 VITALS
HEIGHT: 70 IN | TEMPERATURE: 98.4 F | SYSTOLIC BLOOD PRESSURE: 118 MMHG | DIASTOLIC BLOOD PRESSURE: 64 MMHG | RESPIRATION RATE: 16 BRPM | HEART RATE: 65 BPM | BODY MASS INDEX: 22.48 KG/M2 | OXYGEN SATURATION: 96 % | WEIGHT: 157 LBS

## 2019-09-18 DIAGNOSIS — M54.42 ACUTE BILATERAL LOW BACK PAIN WITH LEFT-SIDED SCIATICA: Primary | ICD-10-CM

## 2019-09-18 PROBLEM — G89.29 CHRONIC LEFT-SIDED LOW BACK PAIN WITH SCIATICA: Status: ACTIVE | Noted: 2019-09-18

## 2019-09-18 PROBLEM — M54.40 CHRONIC LEFT-SIDED LOW BACK PAIN WITH SCIATICA: Status: ACTIVE | Noted: 2019-09-18

## 2019-09-18 PROCEDURE — G0463 HOSPITAL OUTPT CLINIC VISIT: HCPCS

## 2019-09-18 PROCEDURE — 99213 OFFICE O/P EST LOW 20 MIN: CPT | Performed by: FAMILY MEDICINE

## 2019-09-18 RX ORDER — PREDNISONE 20 MG/1
TABLET ORAL
Qty: 20 TABLET | Refills: 0 | Status: SHIPPED | OUTPATIENT
Start: 2019-09-18 | End: 2019-09-30

## 2019-09-18 ASSESSMENT — MIFFLIN-ST. JEOR: SCORE: 1449.43

## 2019-09-18 ASSESSMENT — PAIN SCALES - GENERAL: PAINLEVEL: MODERATE PAIN (4)

## 2019-09-18 NOTE — NURSING NOTE
"Chief Complaint   Patient presents with     Back Pain     for 6 weeks       He has had lower back pain for the past 6 weeks. The pain goes down his left leg. The pain is a lot worse in the mornings. He has gone to 3 different chiropractors and they are not helping much. Walking is the best position for him.  Spring Ward LPN..................9/18/2019   2:21 PM      Initial /64   Pulse 65   Temp 98.4  F (36.9  C) (Temporal)   Resp 16   Ht 1.772 m (5' 9.75\")   Wt 71.2 kg (157 lb)   SpO2 96%   BMI 22.69 kg/m   Estimated body mass index is 22.69 kg/m  as calculated from the following:    Height as of this encounter: 1.772 m (5' 9.75\").    Weight as of this encounter: 71.2 kg (157 lb).  Medication Reconciliation: complete    Spring Ward LPN  "

## 2019-09-18 NOTE — TELEPHONE ENCOUNTER
Patient would like his meds sent to [email or web address] instead of [facility].     Any questions please calll patient.   Thank You Treva Meier on 9/18/2019 at 2:47 PM

## 2019-09-18 NOTE — TELEPHONE ENCOUNTER
Spoke with pharmacist at [facility] and called in prednisone and notified patient as well.     Viky Edward LPN...................9/18/2019  2:55 PM

## 2019-09-19 NOTE — PROGRESS NOTES
SUBJECTIVE:   Darrell Peterson is a 74 year old male who presents to clinic today for the following health issues: Back pain    Patient arrives here for back pain.  Patient reports his back pain is periodic.  Seems to come and go.  About 6 weeks ago he was working at [employer] where he was on his knees picking up pain gallons up.  He felt some discomfort in his back at that time.  Since then he is developed pain down the left buttocks.  Occasionally on the right.  He does have a history of back pain and has chronic left foot numbness.  No changes in bowel or bladder habits.  He is seen 3 different chiropractors without any improvement.  Ibuprofen IcyHot has not really worked.  He finds walking seems to help the best.  He also finds himself limping at times        Patient Active Problem List    Diagnosis Date Noted     Chronic left-sided low back pain with sciatica 09/18/2019     Priority: Medium     Age-related nuclear cataract 02/20/2019     Priority: Medium     Acute bilateral low back pain with sciatica 09/26/2018     Priority: Medium     ACP (advance care planning) 06/08/2018     Priority: Medium     Atherosclerotic heart disease 01/17/2018     Priority: Medium     Hyperlipidemia 01/17/2018     Priority: Medium     Migraine headache 01/17/2018     Priority: Medium     Chronic systolic congestive heart failure (H) 11/07/2016     Priority: Medium     Coronary artery disease involving coronary bypass graft of native heart without angina pectoris 10/26/2016     Priority: Medium     Overview:   Three-vessel       Cerebral infarction (H) 02/21/2014     Priority: Medium     Slowing of urinary stream 02/21/2014     Priority: Medium     Past Medical History:   Diagnosis Date     Atherosclerotic heart disease of native coronary artery without angina pectoris     1996,Stents times 3     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      Past Surgical History:   Procedure Laterality Date     ANGIOPLASTY       "9/96, 1996~1997,and angioplasty     COLONOSCOPY  06/04/2009 6/4/09     COLONOSCOPY      1997 Nov     HEART CATH, ANGIOPLASTY      9/96, 1996~1997,x3     HEART CATH, ANGIOPLASTY      1996     OTHER SURGICAL HISTORY      9/96, 1996~1997,[id number],SCAN-ANGIOGRAM,Coronary angiography     OTHER SURGICAL HISTORY      1996,[id number],SCAN-ANGIOGRAM,Coronary arteriography, follow-up angiography in Nov     OTHER SURGICAL HISTORY      10/10/2016,[id number],OTHER,N/A,[facility]     VASECTOMY      1982     Current Outpatient Medications   Medication Sig Dispense Refill     acetaminophen (TYLENOL) 325 MG tablet Take 650 mg by mouth every 4 hours as needed       aspirin 81 MG chewable tablet Take 81 mg by mouth daily       atorvastatin (LIPITOR) 40 MG tablet Take 40 mg by mouth daily       cyanocobalamin (RA VITAMIN B-12 TR) 1000 MCG TBCR Take 1 tablet by mouth daily       magnesium 250 MG tablet Take 1 tablet by mouth daily       metoprolol succinate (TOPROL-XL) 25 MG 24 hr tablet Take 1 tablet by mouth daily       Multiple Vitamin (MULTI-VITAMINS) TABS Take 1 tablet by mouth daily       predniSONE (DELTASONE) 20 MG tablet Take 3 tabs by mouth daily x 3 days, then 2 tabs daily x 3 days, then 1 tab daily x 3 days, then 1/2 tab daily x 3 days. 20 tablet 0     Selenium 200 MCG TBCR        nitroGLYcerin (NITROSTAT) 0.4 MG sublingual tablet Place 0.4 mg under the tongue every 5 minutes as needed       No Known Allergies    Review of Systems     OBJECTIVE:     /64   Pulse 65   Temp 98.4  F (36.9  C) (Temporal)   Resp 16   Ht 1.772 m (5' 9.75\")   Wt 71.2 kg (157 lb)   SpO2 96%   BMI 22.69 kg/m    Body mass index is 22.69 kg/m .  Physical Exam   Constitutional: He appears well-developed.   Eyes: Pupils are equal, round, and reactive to light.   Pulmonary/Chest: Effort normal.   Abdominal: Soft.   Musculoskeletal: Normal range of motion.   Able to walk on toes and heels.  Squats without difficulty.   Neurological:   Negative " straight leg raise.  Deep tendon reflexes at 1+ bilateral no clonus   Skin: Skin is warm.       Diagnostic Test Results:  none     ASSESSMENT/PLAN:         1. Acute bilateral low back pain with left-sided sciatica  Because of the recurrent history recommended prednisone taper.  If no improvement call next week for MRI.  - predniSONE (DELTASONE) 20 MG tablet; Take 3 tabs by mouth daily x 3 days, then 2 tabs daily x 3 days, then 1 tab daily x 3 days, then 1/2 tab daily x 3 days.  Dispense: 20 tablet; Refill: 0      Dariusz Flowers MD  [facility]

## 2019-09-29 ENCOUNTER — HOSPITAL ENCOUNTER (EMERGENCY)
Facility: OTHER | Age: 74
Discharge: HOME | End: 2019-09-29
Attending: FAMILY MEDICINE | Admitting: FAMILY MEDICINE
Payer: COMMERCIAL

## 2019-09-29 VITALS
OXYGEN SATURATION: 97 % | TEMPERATURE: 97.4 F | WEIGHT: 142 LBS | BODY MASS INDEX: 21.03 KG/M2 | DIASTOLIC BLOOD PRESSURE: 76 MMHG | HEART RATE: 88 BPM | HEIGHT: 69 IN | RESPIRATION RATE: 16 BRPM | SYSTOLIC BLOOD PRESSURE: 114 MMHG

## 2019-09-29 DIAGNOSIS — M54.42 ACUTE BILATERAL LOW BACK PAIN WITH LEFT-SIDED SCIATICA: ICD-10-CM

## 2019-09-29 PROCEDURE — 99283 EMERGENCY DEPT VISIT LOW MDM: CPT | Mod: Z6 | Performed by: FAMILY MEDICINE

## 2019-09-29 PROCEDURE — 99282 EMERGENCY DEPT VISIT SF MDM: CPT | Performed by: FAMILY MEDICINE

## 2019-09-29 RX ORDER — OXYCODONE HYDROCHLORIDE 5 MG/1
2.5-5 TABLET ORAL EVERY 6 HOURS PRN
Qty: 12 TABLET | Refills: 0 | Status: SHIPPED | OUTPATIENT
Start: 2019-09-29 | End: 2019-10-02

## 2019-09-29 ASSESSMENT — ENCOUNTER SYMPTOMS
GASTROINTESTINAL NEGATIVE: 1
HEMATOLOGIC/LYMPHATIC NEGATIVE: 1
ACTIVITY CHANGE: 0
CARDIOVASCULAR NEGATIVE: 1
BACK PAIN: 1
PSYCHIATRIC NEGATIVE: 1
RESPIRATORY NEGATIVE: 1

## 2019-09-29 ASSESSMENT — MIFFLIN-ST. JEOR: SCORE: 1369.49

## 2019-09-29 NOTE — ED AVS SNAPSHOT
[facility]  [address]  [place] [address]  Phone:  [phone]  Fax:  [phone]                                    Darrell Peterson   MRN: [id number]    Department:  [facility]   Date of Visit:  9/29/2019           After Visit Summary Signature Page    I have received my discharge instructions, and my questions have been answered. I have discussed any challenges I see with this plan with the nurse or doctor.    ..........................................................................................................................................  Patient/Patient Representative Signature      ..........................................................................................................................................  Patient Representative Print Name and Relationship to Patient    ..................................................               ................................................  Date                                   Time    ..........................................................................................................................................  Reviewed by Signature/Title    ...................................................              ..............................................  Date                                               Time          22EPIC Rev 08/18

## 2019-09-29 NOTE — DISCHARGE INSTRUCTIONS
Please call Dr Flowers if you don't hear from him by 1 pm tomorrow  Tylenol 650 mg  4 times daily for pain.   Oxycodone 5 mg 1/2-1 tablet every 6 hours as needed for pain.

## 2019-09-29 NOTE — ED PROVIDER NOTES
History     Chief Complaint   Patient presents with     Back Pain     HPI  Darrell Peterson is a 74 year old male history of chronic left low back with sciatica who was just seen for his low back pain at 9/18/2019 when he received a tapering dose of steroids.  Patient pain at that time was noted to be periodic but it started after he lifted some pain gallon's up.  He primarily has pain down his left buttock and down the back of his left leg and then into the bottom of his foot.  He has had some chronic left foot numbness that he states is feeling worse.  He has had no changes in bowel or bladder habits.    He is seen a few different chiropractors and the only thing he got relief from was acupuncture and then stopped as soon as he got back into a vehicle and tried to drive.  He did walk for a couple blocks first and it was okay but then it came right back.  Other medications such as ibuprofen have not helped.  Usually tries to work out when the pain is worsening so it improves.    Allergies:  No Known Allergies    Problem List:    Patient Active Problem List    Diagnosis Date Noted     Chronic left-sided low back pain with sciatica 09/18/2019     Priority: Medium     Age-related nuclear cataract 02/20/2019     Priority: Medium     Acute bilateral low back pain with sciatica 09/26/2018     Priority: Medium     ACP (advance care planning) 06/08/2018     Priority: Medium     Atherosclerotic heart disease 01/17/2018     Priority: Medium     Hyperlipidemia 01/17/2018     Priority: Medium     Migraine headache 01/17/2018     Priority: Medium     Chronic systolic congestive heart failure (H) 11/07/2016     Priority: Medium     Coronary artery disease involving coronary bypass graft of native heart without angina pectoris 10/26/2016     Priority: Medium     Overview:   Three-vessel       Cerebral infarction (H) 02/21/2014     Priority: Medium     Slowing of urinary stream 02/21/2014     Priority: Medium        Past Medical  History:    Past Medical History:   Diagnosis Date     Atherosclerotic heart disease of native coronary artery without angina pectoris      Other specified arthropod-borne viral fevers        Past Surgical History:    Past Surgical History:   Procedure Laterality Date     ANGIOPLASTY      , ~,and angioplasty     COLONOSCOPY  2009     COLONOSCOPY      1997     HEART CATH, ANGIOPLASTY      , ,x3     HEART CATH, ANGIOPLASTY           OTHER SURGICAL HISTORY      , ,[id number],SCAN-ANGIOGRAM,Coronary angiography     OTHER SURGICAL HISTORY      ,[id number],SCAN-ANGIOGRAM,Coronary arteriography, follow-up angiography in Nov     OTHER SURGICAL HISTORY      10/10/2016,[id number],OTHER,N/A,"[employer] [facility]     VASECTOMY             Family History:    Family History   Problem Relation Age of Onset     Other - See Comments Mother          at 99     Other - See Comments Father         COPD, hx of smoking     Other - See Comments Brother          of stabbing     Other - See Comments Brother         no heart problems or hyperlipidemia     Other - See Comments Sister      Hyperlipidemia Sister         Hyperlipidemia,70 yrs old, hyperlipidemia     Heart Disease Other         Heart Disease,MI       Social History:  Marital Status:   [2]  Social History     Tobacco Use     Smoking status: Former Smoker     Types: Cigarettes     Last attempt to quit: 1977     Years since quittin.6     Smokeless tobacco: Never Used   Substance Use Topics     Alcohol use: No     Alcohol/week: 0.0 standard drinks     Drug use: No        Medications:    acetaminophen (TYLENOL) 325 MG tablet  aspirin 81 MG chewable tablet  atorvastatin (LIPITOR) 40 MG tablet  cyanocobalamin (RA VITAMIN B-12 TR) 1000 MCG TBCR  magnesium 250 MG tablet  metoprolol succinate (TOPROL-XL) 25 MG 24 hr tablet  Multiple Vitamin (MULTI-VITAMINS) TABS  oxyCODONE (ROXICODONE) 5 MG tablet  predniSONE  "(DELTASONE) 20 MG tablet  Selenium 200 MCG TBCR  nitroGLYcerin (NITROSTAT) 0.4 MG sublingual tablet          Review of Systems   Constitutional: Negative for activity change.   HENT: Negative.    Respiratory: Negative.    Cardiovascular: Negative.    Gastrointestinal: Negative.    Genitourinary: Negative.    Musculoskeletal: Positive for back pain and gait problem.   Hematological: Negative.    Psychiatric/Behavioral: Negative.        Physical Exam   BP: 114/76  Pulse: 88  Temp: 97.4  F (36.3  C)  Resp: 16  Height: 175.3 cm (5' 9\")  Weight: 64.4 kg (142 lb)  SpO2: 97 %      Physical Exam  Vitals signs and nursing note reviewed.   Constitutional:       Appearance: Normal appearance. He is normal weight.   Musculoskeletal: Normal range of motion.         General: Tenderness present. No swelling or deformity.      Right lower leg: No edema.      Comments: Straight leg raising secondary to stretch was painful only on the left.  DTRs are intact at the knees and ankles bilaterally.  Patient is very uncomfortable palpation directly over the sacroiliac joint.   Skin:     General: Skin is warm and dry.   Neurological:      General: No focal deficit present.      Mental Status: He is alert and oriented to person, place, and time.         ED Course   Patient seen and examined.  Recommended the patient that he continues ibuprofen and heat or ice depending on what works best for him.  I suggested that we try to get him in for an injection to the sacroiliac joint to see if that gives him better relief as he is just recently had this tapering dose of steroids.  Do not think additional steroids is going to help but I did give him oxycodone 5 mg 1/2 to 1 tablet every 6 hours as needed for more severe pain.  He is to continue Tylenol 654 times daily for the pain as well.  He should call Dr. Reji cai for a follow-up appointment and to see if he can get in for this injection.  He was comfortable that plan.  All questions were answered " best of my ability.  Informed and they did not think a CT was going to be helpful and that if he needed an MRI could get it for him on a weekend.     Procedures    No results found for this or any previous visit (from the past 24 hour(s)).    Medications - No data to display    Assessments & Plan (with Medical Decision Making)     I have reviewed the nursing notes.    I have reviewed the findings, diagnosis, plan and need for follow up with the patient.    Discharge Medication List as of 9/29/2019 10:00 AM      START taking these medications    Details   oxyCODONE (ROXICODONE) 5 MG tablet Take 0.5-1 tablets (2.5-5 mg) by mouth every 6 hours as needed for pain, Disp-12 tablet, R-0, Local Print             Final diagnoses:   Acute bilateral low back pain with left-sided sciatica       9/29/2019   [facility] AND [facility], Vaibhav GILL MD  09/29/19 [id number]

## 2019-09-29 NOTE — ED TRIAGE NOTES
Patient has been suffering from sciatica since approx August.  Last night and this morning it has become unbearable.  Left glutaeus and left hamstring are painful.  Pain is constant and then it shoots down into calf muscle.  Patient is unable to find a comfortable position.

## 2019-09-29 NOTE — ED NOTES
Patient acknowledges understanding of discharge instructions, including medication usage, hot/cold treatment and follow-up information.      Patient left ambulatory with his wife.

## 2019-09-30 ENCOUNTER — OFFICE VISIT (OUTPATIENT)
Dept: FAMILY MEDICINE | Facility: OTHER | Age: 74
End: 2019-09-30
Attending: NURSE PRACTITIONER
Payer: COMMERCIAL

## 2019-09-30 ENCOUNTER — HOSPITAL ENCOUNTER (OUTPATIENT)
Dept: MRI IMAGING | Facility: OTHER | Age: 74
Discharge: HOME | End: 2019-09-30
Attending: NURSE PRACTITIONER | Admitting: NURSE PRACTITIONER
Payer: COMMERCIAL

## 2019-09-30 VITALS
OXYGEN SATURATION: 97 % | WEIGHT: 157.2 LBS | TEMPERATURE: 97.5 F | RESPIRATION RATE: 16 BRPM | HEART RATE: 78 BPM | HEIGHT: 70 IN | BODY MASS INDEX: 22.5 KG/M2 | SYSTOLIC BLOOD PRESSURE: 120 MMHG | DIASTOLIC BLOOD PRESSURE: 70 MMHG

## 2019-09-30 DIAGNOSIS — M54.16 LUMBAR NERVE ROOT IMPINGEMENT: ICD-10-CM

## 2019-09-30 DIAGNOSIS — M54.16 LUMBAR BACK PAIN WITH RADICULOPATHY AFFECTING LEFT LOWER EXTREMITY: Primary | ICD-10-CM

## 2019-09-30 DIAGNOSIS — M54.16 LUMBAR BACK PAIN WITH RADICULOPATHY AFFECTING LEFT LOWER EXTREMITY: ICD-10-CM

## 2019-09-30 PROBLEM — I63.20: Status: ACTIVE | Noted: 2019-03-12

## 2019-09-30 PROCEDURE — 99214 OFFICE O/P EST MOD 30 MIN: CPT | Performed by: NURSE PRACTITIONER

## 2019-09-30 PROCEDURE — G0463 HOSPITAL OUTPT CLINIC VISIT: HCPCS

## 2019-09-30 PROCEDURE — G0463 HOSPITAL OUTPT CLINIC VISIT: HCPCS | Mod: 25

## 2019-09-30 PROCEDURE — 72148 MRI LUMBAR SPINE W/O DYE: CPT

## 2019-09-30 ASSESSMENT — MIFFLIN-ST. JEOR: SCORE: 1450.33

## 2019-09-30 ASSESSMENT — PAIN SCALES - GENERAL: PAINLEVEL: SEVERE PAIN (6)

## 2019-09-30 NOTE — PROGRESS NOTES
Subjective     Darrell Peterson is a 74 year old male who presents to clinic today for the following health issues:    HPI   Back Pain       Duration: August        Specific cause: turning/bending at work, was putting gallon jugs of fluid on a cart and putting them on the shelve, felt a little achy but pain has worsened    Description:   Location of pain: low back left  Character of pain: sharp, dull ache, shooting and constant  Pain radiation:radiates into the left foot  New numbness or weakness in legs, not attributed to pain:  YES    Intensity: Currently 6-7/10, At its worst 9/10    History:   Pain interferes with job: YES - works at a [employer] store, has not been lifting or getting down on knees  History of back problems: no surgeries; last year had a similar issue that lasted about a week, resolved with chirpractor  Any previous MRI or X-rays: Yes--at [facility].  Date 10/10/18  Sees a specialist for back pain:  Chiropractors (last Dr Rodney)  Therapies tried without relief: acetaminophen (Tylenol), activity, chiropractor, heat, massage, NSAIDs, opioids, rest, sitting, standing and stretch, prednisone    Alleviating factors:   Improved by: nothing      Precipitating factors:  Worsened by: Lifting, Bending, Standing, Sitting and kneeling      Accompanying Signs & Symptoms:  Risk of Fracture:  Age >64  Risk of Cauda Equina:  None  Risk of Infection:  None  Risk of Cancer:  None  Risk of Ankylosing Spondylitis:  Onset at age <35, male, AND morning back stiffness. no       Patient Active Problem List   Diagnosis     Atherosclerotic heart disease     Chronic systolic congestive heart failure (H)     Coronary artery disease involving coronary bypass graft of native heart without angina pectoris     Cerebral infarction (H)     Hyperlipidemia     Migraine headache     Slowing of urinary stream     ACP (advance care planning)     Acute bilateral low back pain with sciatica     Age-related nuclear cataract     Chronic  left-sided low back pain with sciatica     Cerebrovascular accident (CVA) due to stenosis of precerebral artery (H)     Coronary artery disease of native artery of native heart with stable angina pectoris (H)     Past Surgical History:   Procedure Laterality Date     ANGIOPLASTY      , ~,and angioplasty     COLONOSCOPY  2009     COLONOSCOPY      1997     HEART CATH, ANGIOPLASTY      , ~,x3     HEART CATH, ANGIOPLASTY           OTHER SURGICAL HISTORY      , ,[id number],SCAN-ANGIOGRAM,Coronary angiography     OTHER SURGICAL HISTORY      ,[id number],SCAN-ANGIOGRAM,Coronary arteriography, follow-up angiography in Nov     OTHER SURGICAL HISTORY      10/10/2016,[id number],OTHER[facility]N/A[facility]     VASECTOMY             Social History     Tobacco Use     Smoking status: Former Smoker     Types: Cigarettes     Last attempt to quit: 1977     Years since quittin.6     Smokeless tobacco: Never Used   Substance Use Topics     Alcohol use: No     Alcohol/week: 0.0 standard drinks     Family History   Problem Relation Age of Onset     Other - See Comments Mother          at 99     Other - See Comments Father         COPD, hx of smoking     Other - See Comments Brother          of stabbing     Other - See Comments Brother         no heart problems or hyperlipidemia     Other - See Comments Sister      Hyperlipidemia Sister         Hyperlipidemia,70 yrs old, hyperlipidemia     Heart Disease Other         Heart Disease,MI         Current Outpatient Medications   Medication Sig Dispense Refill     acetaminophen (TYLENOL) 325 MG tablet Take 650 mg by mouth every 4 hours as needed       aspirin 81 MG chewable tablet Take 81 mg by mouth daily       atorvastatin (LIPITOR) 40 MG tablet Take 40 mg by mouth daily       cyanocobalamin (RA VITAMIN B-12 TR) 1000 MCG TBCR Take 1 tablet by mouth daily       magnesium 250 MG tablet Take 1 tablet by mouth daily        "metoprolol succinate (TOPROL-XL) 25 MG 24 hr tablet Take 1 tablet by mouth daily       Multiple Vitamin (MULTI-VITAMINS) TABS Take 1 tablet by mouth daily       nitroGLYcerin (NITROSTAT) 0.4 MG sublingual tablet Place 0.4 mg under the tongue every 5 minutes as needed       Selenium 200 MCG TBCR        gabapentin (NEURONTIN) 300 MG capsule Take 1 capsule (300 mg) by mouth 3 times daily 60 capsule 3     oxyCODONE (ROXICODONE) 5 MG tablet Take 0.5-1 tablets (2.5-5 mg) by mouth every 6 hours as needed for pain 30 tablet 0     No Known Allergies    Reviewed and updated as needed this visit by Provider  Tobacco  Allergies  Meds  Problems  Med Hx  Surg Hx  Fam Hx  Soc Hx          Review of Systems   ROS COMP: As above      Objective    /70   Pulse 78   Temp 97.5  F (36.4  C) (Temporal)   Resp 16   Ht 1.772 m (5' 9.75\")   Wt 71.3 kg (157 lb 3.2 oz)   SpO2 97%   BMI 22.72 kg/m    Body mass index is 22.72 kg/m .  Physical Exam   GENERAL: healthy, alert, no distress and appears uncomfortable  SKIN: no suspicious lesions or rashes  NEURO: Normal strength and tone, mentation intact and speech normal  Comprehensive back pain exam:  Tenderness of L SI joint, Range of motion not limited by pain, Lower extremity strength functional and equal on both sides, Lower extremity reflexes within normal limits bilaterally, Lower extremity sensation normal and equal on both sides and Straight leg raise negative bilaterally  PSYCH: mentation appears normal, affect normal/bright    Diagnostic Test Results:  Labs reviewed in Epic    MR LUMBAR SPINE W/O CONTRAST     HISTORY: Radiculopathy, > 6wks conservative tx, persistent sx; Lumbar  back pain with radiculopathy affecting left lower extremity. .      TECHNIQUE: Sagittal T1, T2 and STIR as well as axial T2 images of the  lumbar spine were obtained.     COMPARISON: Radiographs 10/10/2018.     FINDINGS:       Normal lumbar lordosis is maintained.  No abnormalities of " alignment  are identified.  The vertebral body heights are preserved.  The spinal  canal is appears congenitally slender, measuring only 12.7 mm at mid  L4. A probable hemangioma is present within the superior aspect of L4.     The distal cord and conus medullaris have a normal caliber and  morphology.  The conus terminates at L1.  No abnormal cord signal is  seen in the distal cord or conus.  There are no masses in the spinal  canal or paravertebral soft tissues.     At L1-2, the central spinal canal and neural foramina are patent.     At L2-3, there is a mild symmetric disc bulge with mild facet  degenerative hypertrophy. Spinal stenosis is mild. Neural foraminal  narrowing is minimal.     At L3-4, there is a mild asymmetric left disc bulge with a left  foraminal annular fissure and shallow protrusion. There is moderate  facet degenerative hypertrophy. Spinal stenosis is mild. There is  bilateral subarticular zone narrowing. Foraminal stenoses are mild on  the right and moderate on the left. Impingement of the left L3 nerve  root is questioned.     At L4-5, there is a moderate symmetric disc bulge with a superimposed  shallow left subarticular caudal disc extrusion. There is moderate  facet degenerative hypertrophy. Spinal stenosis is moderate to severe.  Foraminal stenoses are moderate. There is impingement of the  descending left L5 nerve root. Subarticular zone stenosis may also  impinge the descending right L5 nerve root.     At L5-S1, there is a moderate symmetric related disc bulge with  posterior lateral endplate osteophytes and moderate facet degenerative  hypertrophy. Spinal stenosis is mild. There is left greater than right  subarticular zone stenoses. Foraminal stenoses are moderate on the  left and mild on the right.                                                                      IMPRESSION:     A shallow caudal left subarticular disc extrusion arises from the L4-5  disc and impinges the  descending left L5 nerve root.     PORTER MORAN MD        Assessment & Plan     1. Lumbar back pain with radiculopathy affecting left lower extremity  2. Lumbar nerve root impingement  Re-evaluation of low back pain with L radiculopathy down to toes. Has completed a prednisone burst, which was of no benefit, and has been given some narcotics for pain control from ER. Presents here for further management. Discussed symptoms concerning for disc herniation, MRI obtained as above. I did message Dr Moran for an opinion on whether Darrell may benefit from an epidural injection with above results, he absolutely believes he could. I would like to send Darrell to neurosurgery as well, though he is reluctant to do this at this time. We did also discuss PT, which he will start. Discussed risks of narcotics, he does not wish to use them but will if the pain is unbearable. Ice, heat, rest, position changes, frequent periods of short distance walking, use of good body mechanics, limit lifting to no more than 10#, use of topical creams, massage, etc. Will proceed with epidural injection with Dr Moran and PT. If no improvement, I suggest he be seen by neurosurgery. If any cauda equina symptoms present, he will return immediately to ER.   - MR Lumbar Spine w/o Contrast; Future  - PHYSICAL THERAPY REFERRAL; Future  - XR Lumbar Epidural Injection Incl Imaging; Future       Karely Mascorro NP  [facility] AND hospitals

## 2019-09-30 NOTE — NURSING NOTE
"Chief Complaint   Patient presents with     RECHECK     back pain. possible pinched nerve     Had this a year ago and it was a pinched nerve. He is wanting an MRI and hoping for an injection to the sacroiliac joint as suggested by ER provider.    Initial /70   Pulse 78   Temp 97.5  F (36.4  C) (Temporal)   Resp 16   Ht 1.772 m (5' 9.75\")   Wt 71.3 kg (157 lb 3.2 oz)   SpO2 97%   BMI 22.72 kg/m   Estimated body mass index is 22.72 kg/m  as calculated from the following:    Height as of this encounter: 1.772 m (5' 9.75\").    Weight as of this encounter: 71.3 kg (157 lb 3.2 oz).    Medication Reconciliation: complete      Norma J. Gosselin, LPN  "

## 2019-10-02 ENCOUNTER — OFFICE VISIT (OUTPATIENT)
Dept: FAMILY MEDICINE | Facility: OTHER | Age: 74
End: 2019-10-02
Attending: FAMILY MEDICINE
Payer: COMMERCIAL

## 2019-10-02 VITALS
DIASTOLIC BLOOD PRESSURE: 60 MMHG | SYSTOLIC BLOOD PRESSURE: 110 MMHG | BODY MASS INDEX: 22.56 KG/M2 | TEMPERATURE: 98.6 F | WEIGHT: 157.6 LBS | HEART RATE: 60 BPM | RESPIRATION RATE: 16 BRPM | HEIGHT: 70 IN

## 2019-10-02 DIAGNOSIS — M54.16 LUMBAR NERVE ROOT IMPINGEMENT: Primary | ICD-10-CM

## 2019-10-02 PROCEDURE — 99214 OFFICE O/P EST MOD 30 MIN: CPT | Performed by: FAMILY MEDICINE

## 2019-10-02 PROCEDURE — G0463 HOSPITAL OUTPT CLINIC VISIT: HCPCS

## 2019-10-02 RX ORDER — OXYCODONE HYDROCHLORIDE 5 MG/1
2.5-5 TABLET ORAL EVERY 6 HOURS PRN
Qty: 30 TABLET | Refills: 0 | Status: SHIPPED | OUTPATIENT
Start: 2019-10-02 | End: 2020-01-15

## 2019-10-02 RX ORDER — GABAPENTIN 300 MG/1
300 CAPSULE ORAL 3 TIMES DAILY
Qty: 60 CAPSULE | Refills: 3 | Status: SHIPPED | OUTPATIENT
Start: 2019-10-02 | End: 2020-01-15

## 2019-10-02 ASSESSMENT — ANXIETY QUESTIONNAIRES
5. BEING SO RESTLESS THAT IT IS HARD TO SIT STILL: NOT AT ALL
3. WORRYING TOO MUCH ABOUT DIFFERENT THINGS: NOT AT ALL
4. TROUBLE RELAXING: NOT AT ALL
1. FEELING NERVOUS, ANXIOUS, OR ON EDGE: NOT AT ALL
GAD7 TOTAL SCORE: 0
2. NOT BEING ABLE TO STOP OR CONTROL WORRYING: NOT AT ALL
7. FEELING AFRAID AS IF SOMETHING AWFUL MIGHT HAPPEN: NOT AT ALL
6. BECOMING EASILY ANNOYED OR IRRITABLE: NOT AT ALL

## 2019-10-02 ASSESSMENT — PAIN SCALES - GENERAL: PAINLEVEL: SEVERE PAIN (7)

## 2019-10-02 ASSESSMENT — MIFFLIN-ST. JEOR: SCORE: 1452.15

## 2019-10-02 ASSESSMENT — PATIENT HEALTH QUESTIONNAIRE - PHQ9: SUM OF ALL RESPONSES TO PHQ QUESTIONS 1-9: 0

## 2019-10-02 NOTE — PATIENT INSTRUCTIONS
Start gabapentin one pill once a day for 3 days than 1 pill twice a day for 3 days than one pill tree times a day

## 2019-10-03 ASSESSMENT — ANXIETY QUESTIONNAIRES: GAD7 TOTAL SCORE: 0

## 2019-10-03 NOTE — PROGRESS NOTES
SUBJECTIVE:   Darrell Peterson is a 74 year old male who presents to clinic today for the following health issues: Follow-up MRI    Patient arrives here for follow-up MRI.  He was recently seen in the ER and advised he may be candidate for an injection.  MRI did show a large disc extrusion on the left side.  He continues to be in quite a pain and discomfort.  States the oxycodone just make some fuzzy and does nothing for his leg and back pain.  He would like the reports.  He plans on on bringing him to his chiropractor for an adjustment.  Symptoms been going on for a month and a half.  Do not show any improvement.  Again no changes in bowel or bladder habits.Patient was recently told because he was on the oral steroids he needs to wait for approximately 10 days before he can get an injection.        Patient Active Problem List    Diagnosis Date Noted     Chronic left-sided low back pain with sciatica 09/18/2019     Priority: Medium     Cerebrovascular accident (CVA) due to stenosis of precerebral artery (H) 03/12/2019     Priority: Medium     Collapse of small Vessel in back of his head (per his report)       Age-related nuclear cataract 02/20/2019     Priority: Medium     Acute bilateral low back pain with sciatica 09/26/2018     Priority: Medium     ACP (advance care planning) 06/08/2018     Priority: Medium     Atherosclerotic heart disease 01/17/2018     Priority: Medium     Hyperlipidemia 01/17/2018     Priority: Medium     Migraine headache 01/17/2018     Priority: Medium     Chronic systolic congestive heart failure (H) 11/07/2016     Priority: Medium     Coronary artery disease involving coronary bypass graft of native heart without angina pectoris 10/26/2016     Priority: Medium     Overview:   Three-vessel       Coronary artery disease of native artery of native heart with stable angina pectoris (H) 10/10/2016     Priority: Medium     Cerebral infarction (H) 02/21/2014     Priority: Medium     Slowing of  urinary stream 02/21/2014     Priority: Medium     Past Medical History:   Diagnosis Date     Atherosclerotic heart disease of native coronary artery without angina pectoris     1996,Stents times 3     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      Past Surgical History:   Procedure Laterality Date     ANGIOPLASTY      9/96, 1996~1997,and angioplasty     COLONOSCOPY  06/04/2009 6/4/09     COLONOSCOPY      1997 Nov     HEART CATH, ANGIOPLASTY      9/96, 1996~1997,x3     HEART CATH, ANGIOPLASTY      1996     OTHER SURGICAL HISTORY      9/96, 1996~1997,[id number],SCAN-ANGIOGRAM,Coronary angiography     OTHER SURGICAL HISTORY      1996,[id number],SCAN-ANGIOGRAM,Coronary arteriography, follow-up angiography in Nov     OTHER SURGICAL HISTORY      10/10/2016,[id number],OTHER,N/A,[facility]     VASECTOMY      1982     Current Outpatient Medications   Medication Sig Dispense Refill     gabapentin (NEURONTIN) 300 MG capsule Take 1 capsule (300 mg) by mouth 3 times daily 60 capsule 3     oxyCODONE (ROXICODONE) 5 MG tablet Take 0.5-1 tablets (2.5-5 mg) by mouth every 6 hours as needed for pain 30 tablet 0     acetaminophen (TYLENOL) 325 MG tablet Take 650 mg by mouth every 4 hours as needed       aspirin 81 MG chewable tablet Take 81 mg by mouth daily       atorvastatin (LIPITOR) 40 MG tablet Take 40 mg by mouth daily       cyanocobalamin (RA VITAMIN B-12 TR) 1000 MCG TBCR Take 1 tablet by mouth daily       magnesium 250 MG tablet Take 1 tablet by mouth daily       metoprolol succinate (TOPROL-XL) 25 MG 24 hr tablet Take 1 tablet by mouth daily       Multiple Vitamin (MULTI-VITAMINS) TABS Take 1 tablet by mouth daily       nitroGLYcerin (NITROSTAT) 0.4 MG sublingual tablet Place 0.4 mg under the tongue every 5 minutes as needed       Selenium 200 MCG TBCR        No Known Allergies    Review of Systems     OBJECTIVE:     /60 (BP Location: Right arm, Patient Position: Sitting, Cuff Size: Adult Large)   Pulse 60  "  Temp 98.6  F (37  C)   Resp 16   Ht 1.772 m (5' 9.75\")   Wt 71.5 kg (157 lb 9.6 oz)   BMI 22.78 kg/m    Body mass index is 22.78 kg/m .  Physical Exam  Constitutional:       Comments: Appears in moderate amount of discomfort.   HENT:      Head: Normocephalic.   Musculoskeletal:      Comments: Slight limp is present   Neurological:      Mental Status: He is alert.             ASSESSMENT/PLAN:         1. Lumbar nerve root impingement  Long discussion with patient and his family.  His daughter is present who works as a occupational therapist.  Advised the patient we could certainly proceed with injection but I highly suspect he might get more benefit surgically.  Patient would like to pursue both options.  Orders written for neurosurgery referral.  Also discussed Neurontin.  Patient is interested in trying Neurontin.  He is not keen on continuing the oxycodone.  He is given a prescription for 30 in case he should change his mind.  - gabapentin (NEURONTIN) 300 MG capsule; Take 1 capsule (300 mg) by mouth 3 times daily  Dispense: 60 capsule; Refill: 3  - NEUROSURGERY REFERRAL  - oxyCODONE (ROXICODONE) 5 MG tablet; Take 0.5-1 tablets (2.5-5 mg) by mouth every 6 hours as needed for pain  Dispense: 30 tablet; Refill: 0  - INJ TRANSFORAMIN EPIDURAL, LUMB/SACR EA ADDTL  25 minutes spent with the patient family greater than 50% counseling coordinating care.    Dariusz Flowers MD  [facility] [place]  "

## 2019-10-09 ENCOUNTER — HOSPITAL ENCOUNTER (OUTPATIENT)
Dept: GENERAL RADIOLOGY | Facility: OTHER | Age: 74
Discharge: HOME | End: 2019-10-09
Attending: NURSE PRACTITIONER | Admitting: NURSE PRACTITIONER
Payer: COMMERCIAL

## 2019-10-09 DIAGNOSIS — M54.16 LUMBAR NERVE ROOT IMPINGEMENT: ICD-10-CM

## 2019-10-09 PROCEDURE — 64483 NJX AA&/STRD TFRM EPI L/S 1: CPT

## 2019-10-09 PROCEDURE — 25000128 H RX IP 250 OP 636: Performed by: RADIOLOGY

## 2019-10-09 PROCEDURE — 25500064 ZZH RX 255 OP 636: Performed by: RADIOLOGY

## 2019-10-09 PROCEDURE — 25000125 ZZHC RX 250: Performed by: RADIOLOGY

## 2019-10-09 RX ORDER — LIDOCAINE HYDROCHLORIDE 10 MG/ML
2 INJECTION, SOLUTION INFILTRATION; PERINEURAL ONCE
Status: COMPLETED | OUTPATIENT
Start: 2019-10-09 | End: 2019-10-09

## 2019-10-09 RX ORDER — DEXAMETHASONE SODIUM PHOSPHATE 10 MG/ML
10 INJECTION, SOLUTION INTRAMUSCULAR; INTRAVENOUS ONCE
Status: COMPLETED | OUTPATIENT
Start: 2019-10-09 | End: 2019-10-09

## 2019-10-09 RX ORDER — METHYLPREDNISOLONE ACETATE 80 MG/ML
80 INJECTION, SUSPENSION INTRA-ARTICULAR; INTRALESIONAL; INTRAMUSCULAR; SOFT TISSUE ONCE
Status: DISCONTINUED | OUTPATIENT
Start: 2019-10-09 | End: 2019-10-10 | Stop reason: HOSPADM

## 2019-10-09 RX ORDER — LIDOCAINE HYDROCHLORIDE 10 MG/ML
2 INJECTION, SOLUTION EPIDURAL; INFILTRATION; INTRACAUDAL; PERINEURAL ONCE
Status: COMPLETED | OUTPATIENT
Start: 2019-10-09 | End: 2019-10-09

## 2019-10-09 RX ADMIN — LIDOCAINE HYDROCHLORIDE 2 ML: 10 INJECTION, SOLUTION INFILTRATION; PERINEURAL at 12:48

## 2019-10-09 RX ADMIN — LIDOCAINE HYDROCHLORIDE 2 ML: 10 INJECTION, SOLUTION EPIDURAL; INFILTRATION; INTRACAUDAL; PERINEURAL at 12:48

## 2019-10-09 RX ADMIN — IOHEXOL 2 ML: 240 INJECTION, SOLUTION INTRATHECAL; INTRAVASCULAR; INTRAVENOUS; ORAL at 12:48

## 2019-10-09 RX ADMIN — DEXAMETHASONE SODIUM PHOSPHATE 10 MG: 10 INJECTION, SOLUTION INTRAMUSCULAR; INTRAVENOUS at 12:49

## 2019-10-24 ENCOUNTER — HOSPITAL ENCOUNTER (OUTPATIENT)
Dept: PHYSICAL THERAPY | Facility: OTHER | Age: 74
Setting detail: THERAPIES SERIES
End: 2019-10-24
Attending: NURSE PRACTITIONER
Payer: COMMERCIAL

## 2019-10-24 DIAGNOSIS — M54.16 LUMBAR BACK PAIN WITH RADICULOPATHY AFFECTING LEFT LOWER EXTREMITY: ICD-10-CM

## 2019-10-24 PROCEDURE — 97112 NEUROMUSCULAR REEDUCATION: CPT | Mod: GP

## 2019-10-24 PROCEDURE — 97161 PT EVAL LOW COMPLEX 20 MIN: CPT | Mod: GP

## 2019-10-24 NOTE — PROGRESS NOTES
[facility]          OUTPATIENT PHYSICAL THERAPY ORTHOPEDIC EVALUATION  PLAN OF TREATMENT FOR OUTPATIENT REHABILITATION  (COMPLETE FOR INITIAL CLAIMS ONLY)  Patient's Last Name, First Name, M.I.  YOB: 1945  Darrell Peterson    Provider s Name:  [facility]   Medical Record No.  [id number]   Start of Care Date:  10/24/19   Onset Date:  08/01/19   Type:     _X__PT   ___OT   ___SLP Medical Diagnosis:   Lumbar back pain with radiculopathy affecting left lower extremity     PT Diagnosis:  impaired mobility   Visits from SOC:  1      _________________________________________________________________________________  Plan of Treatment/Functional Goals:  joint mobilization, manual therapy, neuromuscular re-education, ROM, strengthening, stretching     Cryotherapy, Hot packs, TENS, Traction, Ultrasound     Goals  Goal Identifier: mobility  Goal Description: Pt will have improved lumbosacral mobility to improve radicular pain by 50% or more.  Target Date: 12/05/19    Goal Identifier: core  Goal Description: Pt will have improved core and hip strength to allow bending for work duties with appropriate posture and pain less than 4/10.  Target Date: 12/19/19    Goal Identifier: HEP  Goal Description: Pt will be independent and consistent with performance of a customized home exercise program for self management of symptoms.  Target Date: 01/02/20                  Therapy Frequency:  2 times/Week  Predicted Duration of Therapy Intervention:  10 weeks    Alvina Sanderson PT                 I CERTIFY THE NEED FOR THESE SERVICES FURNISHED UNDER        THIS PLAN OF TREATMENT AND WHILE UNDER MY CARE     (Physician co-signature of this document indicates review and certification of the therapy plan).                       Certification Date From:      Certification Date To:       Referring Provider:  Karely Mascorro NP    Initial Assessment        See Epic Evaluation Start of Care  Date: 10/24/19

## 2019-10-24 NOTE — PROGRESS NOTES
10/24/19 0700   General Information   Type of Visit Initial OP Ortho PT Evaluation   Start of Care Date 10/24/19   Referring Physician Karely Mascorro NP   Patient/Family Goals Statement decrease back pain   Orders Evaluate and Treat   Certification Required? Yes   Medical Diagnosis lumbar back pain with radiculopathy affecting left lower extremity   Surgical/Medical history reviewed Yes  (triple bypass)   Body Part(s)   Body Part(s) Lumbar Spine/SI   Presentation and Etiology   Pertinent history of current problem (include personal factors and/or comorbidities that impact the POC) First part of August, was on his knees twisting and lifting gallon jugs over to a cart and has bothered ever since but did have an injection and has improved since that. Had been seeing a chiropractor but not currently. Sees a neurosurgeon mid November.   Impairments A. Pain;B. Decreased WB tolerance;F. Decreased strength and endurance;K. Numbness;L. Tingling   Functional Limitations perform activities of daily living;perform desired leisure / sports activities   Symptom Location lower back and left leg, calf and foot   How/Where did it occur While lifting;With repetition/overuse   Onset date of current episode/exacerbation 08/01/19   Chronicity Recurrent   Pain rating (0-10 point scale) Best (/10);Worst (/10)   Best (/10) 4/10   Worst (/10) 9/10   Pain quality C. Aching;G. Cramping   Frequency of pain/symptoms A. Constant   Pain/symptoms are: Worse in the morning   Pain/symptoms exacerbated by A. Sitting;C. Lifting;G. Certain positions;I. Bending;M. Other   Pain exacerbation comment tasks on his knees   Pain/symptoms eased by B. Walking;G. Heat;E. Changing positions   Current / Previous Interventions   Diagnostic Tests: MRI   MRI Results Results   MRI results disc extrusion left L4-5; stenosis   Prior Level of Function   Prior Level of Function-Mobility Independent   Current Level of Function   Current Community Support Family/friend  caregiver   Patient role/employment history A. Employed   Employment Comments [employer] part time   Living environment [facility]   Home/community accessibility 3 steps inside; 1 flight to basement   Fall Risk Screen   Fall screen completed by PT   Have you fallen 2 or more times in the past year? No   Have you fallen and had an injury in the past year? No   Is patient a fall risk? No   Abuse Screen (yes response referral indicated)   Feels Unsafe at Home or Work/School no   Feels Threatened by Someone no   Does Anyone Try to Keep You From Having Contact with Others or Doing Things Outside Your Home? no   Physical Signs of Abuse Present no   Lumbar Spine/SI Objective Findings   Observation lumbar shift with forward bend   Flexion ROM to knees   Extension ROM VE 5-10 degrees   Pelvic Screen R FFT   Hip Screen negative FADIR; positive EDWIGE B   Hamstring Flexibility impaired   Piriformis Flexibility impaired   SLR tight pain but negative   Crossover SLR + L   Segmental Mobility L5 FRS L   Palpation tender left SIJ   Planned Therapy Interventions   Planned Therapy Interventions joint mobilization;manual therapy;neuromuscular re-education;ROM;strengthening;stretching   Planned Modality Interventions   Planned Modality Interventions Cryotherapy;Hot packs;TENS;Traction;Ultrasound   Clinical Impression   Criteria for Skilled Therapeutic Interventions Met yes, treatment indicated   PT Diagnosis impaired mobility   Influenced by the following impairments joint and soft tissue mobility; posture   Functional limitations due to impairments bending, work duties, donning shoes   Clinical Presentation Stable/Uncomplicated   Clinical Decision Making (Complexity) Low complexity   Therapy Frequency 2 times/Week   Predicted Duration of Therapy Intervention (days/wks) 10 weeks   Risk & Benefits of therapy have been explained Yes   Patient, Family & other staff in agreement with plan of care Yes   Clinical  Impression Comments Pt demonstrates impaired lumbosacral mobility and soft tissue restrictions due to discogenic pain with radicular symptoms of the left leg. He will benefit from skilled PT services to address limitations.   Education Assessment   Preferred Learning Style Listening;Demonstration;Pictures/video   Barriers to Learning No barriers   ORTHO GOALS   PT Ortho Eval Goals 1;2;3   Ortho Goal 1   Goal Identifier mobility   Goal Description Pt will have improved lumbosacral mobility to improve radicular pain by 50% or more.   Target Date 12/05/19   Ortho Goal 2   Goal Identifier core   Goal Description Pt will have improved core and hip strength to allow bending for work duties with appropriate posture and pain less than 4/10.   Target Date 12/19/19   Ortho Goal 3   Goal Identifier HEP   Goal Description Pt will be independent and consistent with performance of a customized home exercise program for self management of symptoms.   Target Date 01/02/20   Total Evaluation Time   PT Sarah Low Complexity Minutes ([id number]) 30

## 2019-10-29 ENCOUNTER — HOSPITAL ENCOUNTER (OUTPATIENT)
Dept: PHYSICAL THERAPY | Facility: OTHER | Age: 74
Setting detail: THERAPIES SERIES
End: 2019-10-29
Attending: NURSE PRACTITIONER
Payer: COMMERCIAL

## 2019-10-29 PROCEDURE — 97112 NEUROMUSCULAR REEDUCATION: CPT | Mod: GP

## 2019-11-05 ENCOUNTER — HOSPITAL ENCOUNTER (OUTPATIENT)
Dept: PHYSICAL THERAPY | Facility: OTHER | Age: 74
Setting detail: THERAPIES SERIES
End: 2019-11-05
Attending: NURSE PRACTITIONER
Payer: COMMERCIAL

## 2019-11-05 PROCEDURE — 97112 NEUROMUSCULAR REEDUCATION: CPT | Mod: GP

## 2019-11-12 ENCOUNTER — HOSPITAL ENCOUNTER (OUTPATIENT)
Dept: PHYSICAL THERAPY | Facility: OTHER | Age: 74
Setting detail: THERAPIES SERIES
End: 2019-11-12
Attending: NURSE PRACTITIONER
Payer: COMMERCIAL

## 2019-11-12 PROCEDURE — 97112 NEUROMUSCULAR REEDUCATION: CPT | Mod: GP

## 2019-11-14 ENCOUNTER — HOSPITAL ENCOUNTER (OUTPATIENT)
Dept: PHYSICAL THERAPY | Facility: OTHER | Age: 74
Setting detail: THERAPIES SERIES
End: 2019-11-14
Attending: NURSE PRACTITIONER
Payer: COMMERCIAL

## 2019-11-14 PROCEDURE — 97112 NEUROMUSCULAR REEDUCATION: CPT | Mod: GP

## 2019-11-26 ENCOUNTER — HOSPITAL ENCOUNTER (OUTPATIENT)
Dept: PHYSICAL THERAPY | Facility: OTHER | Age: 74
Setting detail: THERAPIES SERIES
End: 2019-11-26
Attending: NURSE PRACTITIONER
Payer: COMMERCIAL

## 2019-11-26 PROCEDURE — 97112 NEUROMUSCULAR REEDUCATION: CPT | Mod: GP

## 2019-12-17 ENCOUNTER — HOSPITAL ENCOUNTER (OUTPATIENT)
Dept: PHYSICAL THERAPY | Facility: OTHER | Age: 74
Setting detail: THERAPIES SERIES
End: 2019-12-17
Attending: NURSE PRACTITIONER
Payer: COMMERCIAL

## 2019-12-17 PROCEDURE — 97112 NEUROMUSCULAR REEDUCATION: CPT | Mod: GP

## 2020-01-14 ENCOUNTER — NURSE TRIAGE (OUTPATIENT)
Dept: FAMILY MEDICINE | Facility: OTHER | Age: 75
End: 2020-01-14

## 2020-01-14 NOTE — TELEPHONE ENCOUNTER
"Patient calling and states had an episode of fingers up to elbow going numb and tingling today, lasted about 10 min.  Denies weakness, or injury  States had episode about one week ago but not as much was numb.  Patient states has a history of triple bypass in 2016.  Denies any chest pain or shortness of breath.  States did take a \"nitroglycerin today when numbness came on but did not help/  Patient was advised to be seen now but is end of day no available appts.  Patient was advised to present to ED and patient states he would like to see if Dr. Flowers could see him tomorrow .    Will route to Dr. Flowers for review and consideration.  Kerry Abebe RN on 1/14/2020 at 4:23 PM    Additional Information    Negative: Difficult to awaken or acting confused (e.g., disoriented, slurred speech)    Negative: New neurologic deficit that is present NOW, sudden onset of ANY of the following: * Weakness of the face, arm, or leg on one side of the body * Numbness of the face, arm, or leg on one side of the body * Loss of speech or garbled speech    Negative: Sounds like a life-threatening emergency to the triager    Negative: Confusion, disorientation, or hallucinations is the main symptom    Negative: Dizziness is the main symptom    Negative: Followed a head injury within last 3 days    Negative: Headache (with neurologic deficit)    Negative: Unable to urinate (or only a few drops) and bladder feels very full    Negative: Loss of control of bowel or bladder (i.e., incontinence) of new onset    Negative: Back pain with numbness (loss of sensation) in groin or rectal area    Negative: Patient sounds very sick or weak to the triager    Neurologic deficit that was brief (now gone), ANY of the following: * Weakness of the face, arm, or leg on one side of the body * Numbness of the face, arm, or leg on one side of the body * Loss of speech or garbled speech    Answer Assessment - Initial Assessment Questions  1. SYMPTOM: \"What is " "the main symptom you are concerned about?\" (e.g., weakness, numbness)      Numbness left hand  2. ONSET: \"When did this start?\" (minutes, hours, days; while sleeping)      today  3. LAST NORMAL: \"When was the last time you were normal (no symptoms)?\"      Right now  4. PATTERN \"Does this come and go, or has it been constant since it started?\"  \"Is it present now?\"      Come and goes  5. CARDIAC SYMPTOMS: \"Have you had any of the following symptoms: chest pain, difficulty breathing, palpitations?\"      denies  6. NEUROLOGIC SYMPTOMS: \"Have you had any of the following symptoms: headache, dizziness, vision loss, double vision, changes in speech, unsteady on your feet?\"      denies  7. OTHER SYMPTOMS: \"Do you have any other symptoms?\"      denies  8. PREGNANCY: \"Is there any chance you are pregnant?\" \"When was your last menstrual period?\"      n/a    Protocols used: NEUROLOGIC DEFICIT-A-[place]      "

## 2020-01-14 NOTE — TELEPHONE ENCOUNTER
Called patient and informed patient that Dr. Flowers agreed with patient to present to ED but will see him tomorrow if he does not want to go to ED.    Patient states it would be difficult for him to get to ED tonight and would prefer to see Dr. Flowers tomorrow.  Patient will see Dr. Flowers tomorrow at 9:45 tomorrow.    Patient will present to ED if symptoms return or any other concerns prior to appt.    Kerry Abebe RN on 1/14/2020 at 4:47 PM

## 2020-01-15 ENCOUNTER — OFFICE VISIT (OUTPATIENT)
Dept: FAMILY MEDICINE | Facility: OTHER | Age: 75
End: 2020-01-15
Attending: FAMILY MEDICINE
Payer: COMMERCIAL

## 2020-01-15 VITALS
HEIGHT: 69 IN | WEIGHT: 153.4 LBS | HEART RATE: 56 BPM | DIASTOLIC BLOOD PRESSURE: 60 MMHG | RESPIRATION RATE: 16 BRPM | BODY MASS INDEX: 22.72 KG/M2 | TEMPERATURE: 97.6 F | SYSTOLIC BLOOD PRESSURE: 110 MMHG

## 2020-01-15 DIAGNOSIS — G56.02 CARPAL TUNNEL SYNDROME OF LEFT WRIST: Primary | ICD-10-CM

## 2020-01-15 PROCEDURE — 99213 OFFICE O/P EST LOW 20 MIN: CPT | Performed by: FAMILY MEDICINE

## 2020-01-15 PROCEDURE — G0463 HOSPITAL OUTPT CLINIC VISIT: HCPCS

## 2020-01-15 ASSESSMENT — PATIENT HEALTH QUESTIONNAIRE - PHQ9: SUM OF ALL RESPONSES TO PHQ QUESTIONS 1-9: 0

## 2020-01-15 ASSESSMENT — MIFFLIN-ST. JEOR: SCORE: 1421.2

## 2020-01-15 ASSESSMENT — PAIN SCALES - GENERAL: PAINLEVEL: NO PAIN (0)

## 2020-01-15 NOTE — NURSING NOTE
Patient here for left hand numbness and tingling while he was driving and lasted 10-15 minutes and then went away. Medication Reconciliation: complete.    No Suarez LPN  1/15/2020 10:04 AM

## 2020-01-16 NOTE — PROGRESS NOTES
SUBJECTIVE:   Darrell Peterson is a 74 year old male who presents to clinic today for the following health issues: Numbness patient arrives here with left hand numbness.     Lasted about 10 to 15 minutes while driving.  No shortness of breath no chest pain.  Patient is with his wife and is concerned about heart issues.  Just prior to this occurring patient had been snowplowing snow.  He also reports that he is spent a lot of time in the woods walking through the snow.  Did not occur during that period of time.  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  When he is walking up the hill he does not exhibit any chest pain or hand discomfort        Patient Active Problem List    Diagnosis Date Noted     Chronic left-sided low back pain with sciatica 09/18/2019     Priority: Medium     Cerebrovascular accident (CVA) due to stenosis of precerebral artery (H) 03/12/2019     Priority: Medium     Collapse of small Vessel in back of his head (per his report)       Age-related nuclear cataract 02/20/2019     Priority: Medium     Acute bilateral low back pain with sciatica 09/26/2018     Priority: Medium     ACP (advance care planning) 06/08/2018     Priority: Medium     Atherosclerotic heart disease 01/17/2018     Priority: Medium     Hyperlipidemia 01/17/2018     Priority: Medium     Migraine headache 01/17/2018     Priority: Medium     Chronic systolic congestive heart failure (H) 11/07/2016     Priority: Medium     Coronary artery disease involving coronary bypass graft of native heart without angina pectoris 10/26/2016     Priority: Medium     Overview:   Three-vessel       Coronary artery disease of native artery of native heart with stable angina pectoris (H) 10/10/2016     Priority: Medium     Cerebral infarction (H) 02/21/2014     Priority: Medium     Slowing of urinary stream 02/21/2014     Priority: Medium     Past Medical History:   Diagnosis Date     Atherosclerotic heart disease of native  "coronary artery without angina pectoris     1996,Stents times 3     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      Past Surgical History:   Procedure Laterality Date     ANGIOPLASTY      9/96, 1996~1997,and angioplasty     COLONOSCOPY  06/04/2009 6/4/09     COLONOSCOPY      1997 Nov     HEART CATH, ANGIOPLASTY      9/96, 1996~1997,x3     HEART CATH, ANGIOPLASTY      1996     OTHER SURGICAL HISTORY      9/96, 1996~1997,[id number],SCAN-ANGIOGRAM,Coronary angiography     OTHER SURGICAL HISTORY      1996,316152,SCAN-ANGIOGRAM,Coronary arteriography, follow-up angiography in Nov     OTHER SURGICAL HISTORY      10/10/2016,[id number],OTHER,N/A,[facility]     VASECTOMY      1982     No Known Allergies    Review of Systems     OBJECTIVE:     /60   Pulse 56   Temp 97.6  F (36.4  C)   Resp 16   Ht 1.753 m (5' 9\")   Wt 69.6 kg (153 lb 6.4 oz)   BMI 22.65 kg/m     Body mass index is 22.65 kg/m .  Physical Exam  Constitutional:       Appearance: Normal appearance.   Musculoskeletal:      Comments: Good peripheral pulses in both hands.  Negative Tinel sign.  Good strength   Skin:     General: Skin is warm.   Neurological:      Mental Status: He is alert.         Diagnostic Test Results:  none     ASSESSMENT/PLAN:         1. Carpal tunnel syndrome of left wrist  Symptoms have resolved.  I suspect mild compression of the median nerve.  Reassurance is given that I did not feel that this was cardiac in origin especially with his activity in the woods.  Follow-up ИВАНN        Dariusz Flowers MD  [facility]  "

## 2020-03-26 NOTE — PROGRESS NOTES
Outpatient Physical Therapy Discharge Note     Patient: Darrell Peterson  : 1945    Beginning/End Dates of Reporting Period:  10/25/2019 to 3/26/2020    Referring Provider: Karely Mascorro CNP    Therapy Diagnosis: impaired mobility     Client Self Report: Pt denies pain into the leg since last visit. Has even been able to do some sledding without any back pain. Denies pain currently.     Objective Measurements:  Objective Measure: segmental mobility  Details: good  Objective Measure: Pelvic symmetry  Details: equal                              Outcome Measures (most recent score):  Liliya STarT    Liliya STarT    Oswestry Score (%): 4 %    Goals:  Goal Identifier mobility   Goal Description Pt will have improved lumbosacral mobility to improve radicular pain by 50% or more.   Target Date 19   Date Met  19   Progress:     Goal Identifier core   Goal Description Pt will have improved core and hip strength to allow bending for work duties with appropriate posture and pain less than 4/10.   Target Date 19   Date Met  19   Progress:     Goal Identifier HEP   Goal Description Pt will be independent and consistent with performance of a customized home exercise program for self management of symptoms.   Target Date 20   Date Met  (progressing- not consistent)   Progress:     Goal Identifier     Goal Description     Target Date     Date Met      Progress:     Goal Identifier     Goal Description     Target Date     Date Met      Progress:     Goal Identifier     Goal Description     Target Date     Date Met      Progress:     Goal Identifier     Goal Description     Target Date     Date Met      Progress:     Goal Identifier     Goal Description     Target Date     Date Met      Progress:     Progress Toward Goals:   Not assessed this period.          Plan:  Discharge from therapy.    Discharge:    Reason for Discharge: Patient chooses to discontinue therapy.  Patient has failed to schedule  further appointments.    Equipment Issued: n/a    Discharge Plan: Patient to continue home program.

## 2020-07-17 ENCOUNTER — ALLIED HEALTH/NURSE VISIT (OUTPATIENT)
Dept: FAMILY MEDICINE | Facility: OTHER | Age: 75
End: 2020-07-17
Attending: FAMILY MEDICINE
Payer: COMMERCIAL

## 2020-07-17 DIAGNOSIS — Z20.822 SUSPECTED 2019 NOVEL CORONAVIRUS INFECTION: Primary | ICD-10-CM

## 2020-07-17 PROCEDURE — 99207 ZZC NO CHARGE NURSE ONLY: CPT

## 2020-07-17 PROCEDURE — U0003 INFECTIOUS AGENT DETECTION BY NUCLEIC ACID (DNA OR RNA); SEVERE ACUTE RESPIRATORY SYNDROME CORONAVIRUS 2 (SARS-COV-2) (CORONAVIRUS DISEASE [COVID-19]), AMPLIFIED PROBE TECHNIQUE, MAKING USE OF HIGH THROUGHPUT TECHNOLOGIES AS DESCRIBED BY CMS-2020-01-R: HCPCS | Mod: ZL | Performed by: FAMILY MEDICINE

## 2020-07-17 PROCEDURE — C9803 HOPD COVID-19 SPEC COLLECT: HCPCS

## 2020-07-17 NOTE — LETTER
July 21, 2020        Darrell Peterson  [address] [place] [address]    This letter provides a written record that you were tested for COVID-19 on 7/17/20.       Your result was negative. This means that we didn t find the virus that causes COVID-19 in your sample. A test may show negative when you do actually have the virus. This can happen when the virus is in the early stages of infection, before you feel illness symptoms.    If you have symptoms   Stay home and away from others (self-isolate) until you meet ALL of the guidelines below:    You ve had no fever--and no medicine that reduces fever--for 3 full days (72 hours). And      Your other symptoms have gotten better. For example, your cough or breathing has improved. And     At least 10 days have passed since your symptoms started.    During this time:    Stay home. Don t go to work, school or anywhere else.     Stay in your own room, including for meals. Use your own bathroom if you can.    Stay away from others in your home. No hugging, kissing or shaking hands. No visitors.    Clean  high touch  surfaces often (doorknobs, counters, handles, etc.). Use a household cleaning spray or wipes. You can find a full list on the EPA website at www.epa.gov/pesticide-registration/list-n-disinfectants-use-against-sars-cov-2.    Cover your mouth and nose with a mask, tissue or washcloth to avoid spreading germs.    Wash your hands and face often with soap and water.    Going back to work  Check with your employer for any guidelines to follow for going back to work.    Employers: This document serves as formal notice that your employee tested negative for COVID-19, as of the testing date shown above.

## 2020-07-18 LAB
SARS-COV-2 RNA SPEC QL NAA+PROBE: NOT DETECTED
SPECIMEN SOURCE: NORMAL

## 2020-09-21 ENCOUNTER — OFFICE VISIT (OUTPATIENT)
Dept: FAMILY MEDICINE | Facility: OTHER | Age: 75
End: 2020-09-21
Attending: PHYSICIAN ASSISTANT
Payer: COMMERCIAL

## 2020-09-21 VITALS
SYSTOLIC BLOOD PRESSURE: 128 MMHG | OXYGEN SATURATION: 97 % | WEIGHT: 154 LBS | RESPIRATION RATE: 20 BRPM | TEMPERATURE: 97 F | HEART RATE: 73 BPM | DIASTOLIC BLOOD PRESSURE: 74 MMHG | BODY MASS INDEX: 22.74 KG/M2

## 2020-09-21 DIAGNOSIS — I25.118 CORONARY ARTERY DISEASE OF NATIVE ARTERY OF NATIVE HEART WITH STABLE ANGINA PECTORIS (H): ICD-10-CM

## 2020-09-21 DIAGNOSIS — R07.9 CHEST PAIN, UNSPECIFIED TYPE: Primary | ICD-10-CM

## 2020-09-21 DIAGNOSIS — L98.9 SKIN LESION: ICD-10-CM

## 2020-09-21 LAB
ANION GAP SERPL CALCULATED.3IONS-SCNC: 6 MMOL/L (ref 3–14)
BASOPHILS # BLD AUTO: 0.1 10E9/L (ref 0–0.2)
BASOPHILS NFR BLD AUTO: 0.8 %
BUN SERPL-MCNC: 15 MG/DL (ref 7–25)
CALCIUM SERPL-MCNC: 8.8 MG/DL (ref 8.6–10.3)
CHLORIDE SERPL-SCNC: 104 MMOL/L (ref 98–107)
CHOLEST SERPL-MCNC: 174 MG/DL
CO2 SERPL-SCNC: 29 MMOL/L (ref 21–31)
CREAT SERPL-MCNC: 0.94 MG/DL (ref 0.7–1.3)
DIFFERENTIAL METHOD BLD: NORMAL
EOSINOPHIL # BLD AUTO: 0.1 10E9/L (ref 0–0.7)
EOSINOPHIL NFR BLD AUTO: 1.4 %
ERYTHROCYTE [DISTWIDTH] IN BLOOD BY AUTOMATED COUNT: 12.2 % (ref 10–15)
GFR SERPL CREATININE-BSD FRML MDRD: 78 ML/MIN/{1.73_M2}
GLUCOSE SERPL-MCNC: 97 MG/DL (ref 70–105)
HCT VFR BLD AUTO: 44.8 % (ref 40–53)
HDLC SERPL-MCNC: 56 MG/DL (ref 23–92)
HGB BLD-MCNC: 14.6 G/DL (ref 13.3–17.7)
IMM GRANULOCYTES # BLD: 0 10E9/L (ref 0–0.4)
IMM GRANULOCYTES NFR BLD: 0.3 %
LDLC SERPL CALC-MCNC: 92 MG/DL
LYMPHOCYTES # BLD AUTO: 1.7 10E9/L (ref 0.8–5.3)
LYMPHOCYTES NFR BLD AUTO: 26.8 %
MCH RBC QN AUTO: 30.2 PG (ref 26.5–33)
MCHC RBC AUTO-ENTMCNC: 32.6 G/DL (ref 31.5–36.5)
MCV RBC AUTO: 93 FL (ref 78–100)
MONOCYTES # BLD AUTO: 0.8 10E9/L (ref 0–1.3)
MONOCYTES NFR BLD AUTO: 12.7 %
NEUTROPHILS # BLD AUTO: 3.7 10E9/L (ref 1.6–8.3)
NEUTROPHILS NFR BLD AUTO: 58 %
NONHDLC SERPL-MCNC: 118 MG/DL
PLATELET # BLD AUTO: 193 10E9/L (ref 150–450)
POTASSIUM SERPL-SCNC: 4.2 MMOL/L (ref 3.5–5.1)
RBC # BLD AUTO: 4.84 10E12/L (ref 4.4–5.9)
SODIUM SERPL-SCNC: 139 MMOL/L (ref 134–144)
TRIGL SERPL-MCNC: 129 MG/DL
TROPONIN I SERPL-MCNC: 9.8 PG/ML
WBC # BLD AUTO: 6.4 10E9/L (ref 4–11)

## 2020-09-21 PROCEDURE — 84484 ASSAY OF TROPONIN QUANT: CPT | Mod: ZL | Performed by: PHYSICIAN ASSISTANT

## 2020-09-21 PROCEDURE — G0463 HOSPITAL OUTPT CLINIC VISIT: HCPCS

## 2020-09-21 PROCEDURE — 93005 ELECTROCARDIOGRAM TRACING: CPT

## 2020-09-21 PROCEDURE — 80061 LIPID PANEL: CPT | Mod: ZL | Performed by: PHYSICIAN ASSISTANT

## 2020-09-21 PROCEDURE — 99214 OFFICE O/P EST MOD 30 MIN: CPT | Performed by: PHYSICIAN ASSISTANT

## 2020-09-21 PROCEDURE — 36415 COLL VENOUS BLD VENIPUNCTURE: CPT | Mod: ZL | Performed by: PHYSICIAN ASSISTANT

## 2020-09-21 PROCEDURE — 93010 ELECTROCARDIOGRAM REPORT: CPT | Performed by: INTERNAL MEDICINE

## 2020-09-21 PROCEDURE — 85025 COMPLETE CBC W/AUTO DIFF WBC: CPT | Mod: ZL | Performed by: PHYSICIAN ASSISTANT

## 2020-09-21 PROCEDURE — G0463 HOSPITAL OUTPT CLINIC VISIT: HCPCS | Mod: 25

## 2020-09-21 PROCEDURE — 80048 BASIC METABOLIC PNL TOTAL CA: CPT | Mod: ZL | Performed by: PHYSICIAN ASSISTANT

## 2020-09-21 RX ORDER — ATORVASTATIN CALCIUM 20 MG/1
20 TABLET, FILM COATED ORAL DAILY
COMMUNITY
Start: 2020-06-20

## 2020-09-21 RX ORDER — NITROGLYCERIN 0.4 MG/1
0.4 TABLET SUBLINGUAL EVERY 5 MIN PRN
Qty: 25 TABLET | Refills: 0 | Status: SHIPPED | OUTPATIENT
Start: 2020-09-21

## 2020-09-21 ASSESSMENT — ANXIETY QUESTIONNAIRES
GAD7 TOTAL SCORE: 3
5. BEING SO RESTLESS THAT IT IS HARD TO SIT STILL: NOT AT ALL
IF YOU CHECKED OFF ANY PROBLEMS ON THIS QUESTIONNAIRE, HOW DIFFICULT HAVE THESE PROBLEMS MADE IT FOR YOU TO DO YOUR WORK, TAKE CARE OF THINGS AT HOME, OR GET ALONG WITH OTHER PEOPLE: SOMEWHAT DIFFICULT
7. FEELING AFRAID AS IF SOMETHING AWFUL MIGHT HAPPEN: NOT AT ALL
2. NOT BEING ABLE TO STOP OR CONTROL WORRYING: NOT AT ALL
3. WORRYING TOO MUCH ABOUT DIFFERENT THINGS: SEVERAL DAYS
6. BECOMING EASILY ANNOYED OR IRRITABLE: SEVERAL DAYS
1. FEELING NERVOUS, ANXIOUS, OR ON EDGE: SEVERAL DAYS

## 2020-09-21 ASSESSMENT — PATIENT HEALTH QUESTIONNAIRE - PHQ9: 5. POOR APPETITE OR OVEREATING: NOT AT ALL

## 2020-09-21 NOTE — NURSING NOTE
Chief Complaint   Patient presents with     Follow Up     chest pain on Saturday- no pain since         Medication Reconciliation: complete    Miranda Manjarrez, LPN
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
## 2020-09-22 LAB — INTERPRETATION ECG - MUSE: NORMAL

## 2020-09-22 ASSESSMENT — ANXIETY QUESTIONNAIRES: GAD7 TOTAL SCORE: 3

## 2021-01-22 ENCOUNTER — TRANSFERRED RECORDS (OUTPATIENT)
Dept: HEALTH INFORMATION MANAGEMENT | Facility: OTHER | Age: 76
End: 2021-01-22

## 2021-01-22 LAB — EJECTION FRACTION: NORMAL %

## 2021-02-22 NOTE — NURSING NOTE
[facility]  [facility]  Dept: [phone]  Dept Fax: [phone]      TELEHEALTH EVALUATION -- Audio/Visual (During PVFVI-59 public health emergency)  This visit was performed via a synchronous telecommunication system. Pt location: Home  Provider location: Office ([address] [place] [address])  Pt notified that this was a virtual visit and that we will submit a claim for reimbursement to their insurance company. They were made aware that any copays, coinsurance amounts, or other amounts not covered will be their responsibility. Pt accepted? yes    SUBJECTIVE     Ella Greenberg is a 36 y.o. female    Pt presents for c/o sore throat starting Wed. Over the weekend her symptoms developed into body aches, headaches, nausea, fever, diarrhea, sinus congestion/drainage. Tmax 100.0. Some chest tightness, cough. She is using tylenol with headache and fever. Pt has not had covid in the past and she is interested in being tested. There is no problem list on file for this patient. No current outpatient medications on file. No current facility-administered medications for this visit. Review of Systems   Constitutional: Positive for fever. Negative for chills and diaphoresis. HENT: Positive for congestion and rhinorrhea. Respiratory: Positive for cough. Negative for shortness of breath. Cardiovascular: Negative for chest pain, palpitations and leg swelling. Gastrointestinal: Positive for diarrhea and nausea. Negative for blood in stool, constipation and vomiting. Genitourinary: Negative for dysuria and hematuria. Musculoskeletal: Positive for myalgias. Neurological: Positive for headaches. Negative for dizziness. All other systems reviewed and are negative. OBJECTIVE     Due to this being a TeleHealth encounter, evaluation of the following organ systems is limited: Vitals/Constitutional/EENT/Resp/CV/GI//MS/Neuro/Skin/Heme-Lymph-Imm. Patient here for left leg pain going on 8 weeks. He did not fill lodine, he did get relief from Aleve 2 tablets. No Suarez LPN .......................10/10/2018  8:42 AM     PHYSICAL EXAMINATION:  [ INSTRUCTIONS:  \"[x]\" Indicates a positive item  \"[]\" Indicates a negative item  -- DELETE ALL ITEMS NOT EXAMINED]  Vital Signs: (All Vital Signs are pt reported, unless otherwise noted)   There were no vitals taken for this visit. Constitutional: [x] Appears well-developed and well-nourished [x] No apparent distress      [] Abnormal-   Mental status  [x] Alert and awake  [x] Oriented to person/place/time [x]Able to follow commands      Eyes:  EOM    [x]  Normal  [] Abnormal-  Sclera  [x]  Normal  [] Abnormal -         Discharge [x]  None visible  [] Abnormal -    HENT:   [x] Normocephalic, atraumatic.   [] Abnormal   [x] Mouth/Throat: Mucous membranes are moist.     External Ears [x] Normal  [] Abnormal-     Neck: [x] No visualized mass     Pulmonary/Chest: [x] Respiratory effort normal.  [x] No visualized signs of difficulty breathing or respiratory distress        [] Abnormal-      Musculoskeletal:   [] Normal gait with no signs of ataxia         [x] Normal range of motion of neck        [] Abnormal-       Neurological:        [x] No Facial Asymmetry (Cranial nerve 7 motor function) (limited exam to video visit)          [x] No gaze palsy        [] Abnormal-         Skin:        [x] No significant exanthematous lesions or discoloration noted on facial skin         [] Abnormal-            Psychiatric:       [x] Normal Affect [x] No Hallucinations        [] Abnormal-       No results found for: LABA1C  No results found for: EAG    Lab Results   Component Value Date    CHOL 233 10/17/2012    TRIG 182 10/17/2012    HDL 44 10/17/2012    LDLCALC 143 10/17/2012       No results found for: NA, K, CL, CO2, BUN, CREATININE, GLUCOSE, CALCIUM, PROT, LABALBU, BILITOT, ALKPHOS, AST, ALT, LABGLOM, GFRAA, AGRATIO, GLOB    No results found for: LABMICR, [id number]    No results found for: TSH, X9GTZCT, K2FZZGK, THYROIDAB, FT3, T4FREE    No results found for: WBC, HGB, HCT, MCV, PLT

## 2021-04-07 NOTE — PROGRESS NOTES
SUBJECTIVE:   Darrell Peterson is a 73 year old male who presents to clinic today for the following health issues: Back pain    HPI Comments: Patient arrives here for back pain.  He is seeing chiropractic care for it.  This been going on for about 6 weeks.  He does have occasional shooting pain down the left leg.  Feels that is slowly getting better.  He has been told he should get an x-ray.  He denies any bowel or bladder problems.    Back Pain            Patient Active Problem List    Diagnosis Date Noted     Acute bilateral low back pain with sciatica 09/26/2018     Priority: Medium     ACP (advance care planning) 06/08/2018     Priority: Medium     Atherosclerotic heart disease 01/17/2018     Priority: Medium     Hyperlipidemia 01/17/2018     Priority: Medium     Migraine headache 01/17/2018     Priority: Medium     Chronic systolic congestive heart failure (H) 11/07/2016     Priority: Medium     Coronary artery disease involving coronary bypass graft of native heart without angina pectoris 10/26/2016     Priority: Medium     Overview:   Three-vessel       Cerebral infarction (H) 02/21/2014     Priority: Medium     Slowing of urinary stream 02/21/2014     Priority: Medium     Past Medical History:   Diagnosis Date     Atherosclerotic heart disease of native coronary artery without angina pectoris     1996,Stents times 3     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      Past Surgical History:   Procedure Laterality Date     ANGIOPLASTY      9/96, 1996~1997,and angioplasty     COLONOSCOPY      6/4/09     COLONOSCOPY      1997 Nov     HEART CATH, ANGIOPLASTY      9/96, 1996~1997,x3     HEART CATH, ANGIOPLASTY      1996     OTHER SURGICAL HISTORY      9/96, 1996~1997,[id number],SCAN-ANGIOGRAM,Coronary angiography     OTHER SURGICAL HISTORY      1996,[id number],SCAN-ANGIOGRAM,Coronary arteriography, follow-up angiography in Nov     OTHER SURGICAL HISTORY      10/10/2016,[id number],OTHER,N/A,[facility]      "VASECTOMY      1982     No Known Allergies    Review of Systems   Musculoskeletal: Positive for back pain.        OBJECTIVE:     /70 (BP Location: Right arm, Patient Position: Sitting, Cuff Size: Adult Regular)  Pulse 60  Temp 97.2  F (36.2  C) (Tympanic)  Ht 5' 9.75\" (1.772 m)  Wt 152 lb 9.6 oz (69.2 kg)  BMI 22.05 kg/m2  Body mass index is 22.05 kg/(m^2).  Physical Exam   Constitutional: He appears well-developed.   HENT:   Head: Normocephalic and atraumatic.   Right Ear: External ear normal.   Left Ear: External ear normal.   Cardiovascular: Normal rate and regular rhythm.    Pulmonary/Chest: Effort normal and breath sounds normal.   Musculoskeletal:   Able to stand on toes and heels equally.  Squats without difficulty.   Neurological: He is alert.   Skin: Skin is warm.   Psychiatric: He has a normal mood and affect.       none     ASSESSMENT/PLAN:         1. Acute bilateral low back pain with left-sided sciatica  Trial of Lodine.  Advised patient I did not feel x-rays were indicated at this time considering he is improving.  If he should worsen he will return outpatient to get x-rays.  This was ordered.  Continue with chiropractic care.  - XR Lumbar Spine 2/3 Views; Future  - etodolac (LODINE) 500 MG tablet; Take 1 tablet (500 mg) by mouth 2 times daily  Dispense: 20 tablet; Refill: 0    2. Coronary artery disease involving coronary bypass graft of native heart without angina pectoris      3. Hyperlipidemia, unspecified hyperlipidemia type          Dariusz Flowers MD  [facility]  " no

## 2021-04-17 ENCOUNTER — ALLIED HEALTH/NURSE VISIT (OUTPATIENT)
Dept: FAMILY MEDICINE | Facility: OTHER | Age: 76
End: 2021-04-17
Attending: FAMILY MEDICINE
Payer: COMMERCIAL

## 2021-04-17 DIAGNOSIS — Z20.822 EXPOSURE TO 2019 NOVEL CORONAVIRUS: Primary | ICD-10-CM

## 2021-04-17 PROCEDURE — C9803 HOPD COVID-19 SPEC COLLECT: HCPCS

## 2021-04-17 PROCEDURE — U0003 INFECTIOUS AGENT DETECTION BY NUCLEIC ACID (DNA OR RNA); SEVERE ACUTE RESPIRATORY SYNDROME CORONAVIRUS 2 (SARS-COV-2) (CORONAVIRUS DISEASE [COVID-19]), AMPLIFIED PROBE TECHNIQUE, MAKING USE OF HIGH THROUGHPUT TECHNOLOGIES AS DESCRIBED BY CMS-2020-01-R: HCPCS | Mod: ZL | Performed by: FAMILY MEDICINE

## 2021-04-17 PROCEDURE — U0005 INFEC AGEN DETEC AMPLI PROBE: HCPCS | Mod: ZL | Performed by: FAMILY MEDICINE

## 2021-04-18 LAB
LABORATORY COMMENT REPORT: ABNORMAL
SARS-COV-2 RNA RESP QL NAA+PROBE: NORMAL
SARS-COV-2 RNA RESP QL NAA+PROBE: POSITIVE
SPECIMEN SOURCE: ABNORMAL
SPECIMEN SOURCE: NORMAL

## 2021-04-19 ENCOUNTER — TELEPHONE (OUTPATIENT)
Dept: FAMILY MEDICINE | Facility: OTHER | Age: 76
End: 2021-04-19

## 2021-04-19 NOTE — TELEPHONE ENCOUNTER
"Coronavirus (COVID-19) Notification    Caller Name (Patient, parent, daughter/son, grandparent, etc)  patient    Reason for call  Notify of Positive Coronavirus (COVID-19) lab results, assess symptoms,  review  [employer] recommendations    Lab Result    Lab test:  2019-nCoV rRt-PCR or SARS-CoV-2 PCR    Oropharyngeal AND/OR nasopharyngeal swabs is POSITIVE for 2019-nCoV RNA/SARS-COV-2 PCR (COVID-19 virus)    RN Recommendations/Instructions per [facility] Coronavirus COVID-19 recommendations    Brief introduction script  Introduce self then review script:  \"I am calling on behalf of [employer].  We were notified that your Coronavirus test (COVID-19) for was POSITIVE for the virus.  I have some information to relay to you but first I wanted to mention that the [place] Dept of Health will be contacting you shortly [it's possible MD already called Patient] to talk to you more about how you are feeling and other people you have had contact with who might now also have the virus.  Also,  [employer] is Partnering with the [facility] for Covid-19 research, you may be contacted directly by research staff.\"    Assessment (Inquire about Patient's current symptoms)   Assessment   Current Symptoms at time of phone call: (if no symptoms, document No symptoms] Body aches, fatigue, sensitive skin   Symptoms onset (if applicable) 4/17/2021     If at time of call, Patients symptoms hare worsened, the Patient should contact 911 or have someone drive them to Emergency Dept promptly:      If Patient calling 911, inform 911 personal that you have tested positive for the Coronavirus (COVID-19).  Place mask on and await 911 to arrive.    If Emergency Dept, If possible, please have another adult drive you to the Emergency Dept but you need to wear mask when in contact with other people.      Monoclonal Antibody Administration    You may be eligible to receive a new treatment with a monoclonal antibody for " "preventing hospitalization in patients at high risk for complications from COVID-19.   This medication is still experimental and available on a limited basis; it is given through an IV and must be given at an infusion center. Please note that not all people who are eligible will receive the medication since it is in limited supply.     Are you interested in being considered for this medication?  No.   Does the patient fit the criteria: Patient declined    If patient qualifies based on above criteria:  \"You will be contacted if you are selected to receive this treatment in the next 1-2 business days.   This is time sensitive and if you are not selected in the next 1-2 business days, you will not receive the medication.  If you do not receive a call to schedule, you have not been selected.\"      Review information with Patient    Your result was positive. This means you have COVID-19 (coronavirus).  We have sent you a letter that reviews the information that I'll be reviewing with you now.    How can I protect others?    If you have symptoms: stay home and away from others (self-isolate) until:    You've had no fever--and no medicine that reduces fever--for 1 full day (24 hours). And       Your other symptoms have gotten better. For example, your cough or breathing has improved. And     At least 10 days have passed since your symptoms started. (If you've been told by a doctor that you have a weak immune system, wait 20 days.)     If you don't have symptoms: Stay home and away from others (self-isolate) until at least 10 days have passed since your first positive COVID-19 test. (Date test collected)    During this time:    Stay in your own room, including for meals. Use your own bathroom if you can.    Stay away from others in your home. No hugging, kissing or shaking hands. No visitors.     Don't go to work, school or anywhere else.     Clean  high touch  surfaces often (doorknobs, counters, handles, etc.). Use a " household cleaning spray or wipes. You'll find a full list on the EPA website at www.epa.gov/pesticide-registration/list-n-disinfectants-use-against-sars-cov-2.     Cover your mouth and nose with a mask, tissue or other face covering to avoid spreading germs.    Wash your hands and face often with soap and water.    Make a list of people you have been in close contact with recently, even if either of you wore a face covering.   ; Start your list from 2 days before you became ill or had a positive test.  ; Include anyone that was within 6 feet of you for a cumulative total of 15 minutes or more in 24 hours. (Example: if you sat next to Salbador for 5 minutes in the morning and 10 minutes in the afternoon, then you were in close contact for 15 minutes total that day. Salbador would be added to your list.)    A public health worker will call or text you. It is important that you answer. They will ask you questions about possible exposures to COVID-19, such as people you have been in direct contact with and places you have visited.    Tell the people on your list that you have COVID-19; they should stay away from others for 14 days starting from the last time they were in contact with you (unless you are told something different from a public health worker).     Caregivers in these groups are at risk for severe illness due to COVID-19:  o People 65 years and older  o People who live in a nursing home or long-term care facility  o People with chronic disease (lung, heart, cancer, diabetes, kidney, liver, immunologic)  o People who have a weakened immune system, including those who:  - Are in cancer treatment  - Take medicine that weakens the immune system, such as corticosteroids  - Had a bone marrow or organ transplant  - Have an immune deficiency  - Have poorly controlled HIV or AIDS  - Are obese (body mass index of 40 or higher)  - Smoke regularly    Caregivers should wear gloves while washing dishes, handling laundry and  cleaning bedrooms and bathrooms.    Wash and dry laundry with special caution. Don't shake dirty laundry, and use the warmest water setting you can.    If you have a weakened immune system, ask your doctor about other actions you should take.    For more tips, go to www.cdc.gov/coronavirus/2019-ncov/downloads/10Things.pdf.    You should not go back to work until you meet the guidelines above for ending your home isolation. You don't need to be retested for COVID-19 before going back to work--studies show that you won't spread the virus if it's been at least 10 days since your symptoms started (or 20 days, if you have a weak immune system).    Employers: This document serves as formal notice of your employee's medical guidelines for going back to work. They must meet the above guidelines before going back to work in person.    How can I take care of myself?    1. Get lots of rest. Drink extra fluids (unless a doctor has told you not to).    2. Take Tylenol (acetaminophen) for fever or pain. If you have liver or kidney problems, ask your family doctor if it's okay to take Tylenol.     Take either:     650 mg (two 325 mg pills) every 4 to 6 hours, or     1,000 mg (two 500 mg pills) every 8 hours as needed.     Note: Don't take more than 3,000 mg in one day. Acetaminophen is found in many medicines (both prescribed and over-the-counter medicines). Read all labels to be sure you don't take too much.    For children, check the Tylenol bottle for the right dose (based on their age or weight).    3. If you have other health problems (like cancer, heart failure, an organ transplant or severe kidney disease): Call your specialty clinic if you don't feel better in the next 2 days.    4. Know when to call 911: Emergency warning signs include:    Trouble breathing or shortness of breath    Pain or pressure in the chest that doesn't go away    Feeling confused like you haven't felt before, or not being able to wake  up    Bluish-colored lips or face    5. Sign up for [employer]. We know it's scary to hear that you have COVID-19. We want to track your symptoms to make sure you're okay over the next 2 weeks. Please look for an email from [employer]--this is a free, online program that we'll use to keep in touch. To sign up, follow the link in the email. Learn more at [email or web address].    Where can I get more information?    [facility]: [email or web address]    Coronavirus Basics: www.[email or web address].[email or web address].[place]./diseases/coronavirus/basics.html    What to Do If You're Sick: www.cdc.gov/coronavirus/2019-ncov/about/steps-when-sick.html    Ending Home Isolation: www.cdc.gov/coronavirus/2019-ncov/hcp/disposition-in-home-patients.html     Caring for Someone with COVID-19: www.cdc.gov/coronavirus/2019-ncov/if-you-are-sick/care-for-someone.html     [facility] clinical trials (COVID-19 research studies): clinicalaffairs.[facility]-clinical-trials     A Positive COVID-19 letter will be sent via [employer] or the mail. (Exception, no letters sent to Presurgerical/Preprocedure Patients)    Loretta Archer LPN

## 2021-04-24 ENCOUNTER — HEALTH MAINTENANCE LETTER (OUTPATIENT)
Age: 76
End: 2021-04-24

## 2021-07-08 ENCOUNTER — TRANSFERRED RECORDS (OUTPATIENT)
Dept: HEALTH INFORMATION MANAGEMENT | Facility: OTHER | Age: 76
End: 2021-07-08

## 2021-10-09 ENCOUNTER — HEALTH MAINTENANCE LETTER (OUTPATIENT)
Age: 76
End: 2021-10-09

## 2022-05-16 ENCOUNTER — HEALTH MAINTENANCE LETTER (OUTPATIENT)
Age: 77
End: 2022-05-16

## 2022-06-15 NOTE — NURSING NOTE
76 year old male w/pmh significant for Afib on Eliquis , HTN ,  HLD , presenting for worsening shortness of breath found to have ROBERT on ? CKD and hyperkalemia with metabolic acidosis      1) ROBERT on ?CKD  ddx includes pre renal 2/2 dec po intake and hctz vs ATN from arb/ hemodynamic changes vs ckd progression  pt not aware of underlying kidney disease--> may have component of Hypertensive Nephropathy  ua and urine lytes reviewed  Renal US --> right kidney 9.8cm and left kidney 9.9cm--> no hydro  keep off ARB and HCTZ  c/w  iV nacl @ 75cc to help in forward flow  avoid nephrotoxins  trend bmp q12h       2) Hyperkalemia  in setting of robert/ckd/hctz/arb  s/p insulin/albuterol and nacl bolus  s/p lokelma  c/w iv nacl @ 75cc to help inc forward flow  NO k diet  renal diet  trend k q8h    3) Metabolic acidosis-  insetting of robert/ckd  c/w nabicarb 650mg po tid  trend bicarb        Dr Romero  [phone]   Patient presents to the clinic to discuss his MRI he had 9/30/19.  Medication Reconciliation Completed.    You Sullivan LPN  10/2/2019 9:58 AM

## 2022-09-11 ENCOUNTER — HEALTH MAINTENANCE LETTER (OUTPATIENT)
Age: 77
End: 2022-09-11

## 2023-03-21 ENCOUNTER — OFFICE VISIT (OUTPATIENT)
Dept: FAMILY MEDICINE | Facility: OTHER | Age: 78
End: 2023-03-21
Attending: NURSE PRACTITIONER
Payer: COMMERCIAL

## 2023-03-21 VITALS
DIASTOLIC BLOOD PRESSURE: 82 MMHG | BODY MASS INDEX: 24.51 KG/M2 | HEART RATE: 73 BPM | TEMPERATURE: 97 F | OXYGEN SATURATION: 97 % | WEIGHT: 166 LBS | RESPIRATION RATE: 16 BRPM | SYSTOLIC BLOOD PRESSURE: 138 MMHG

## 2023-03-21 DIAGNOSIS — R21 RASH OF BACK: Primary | ICD-10-CM

## 2023-03-21 LAB
BASOPHILS # BLD AUTO: 0.1 10E3/UL (ref 0–0.2)
BASOPHILS NFR BLD AUTO: 1 %
CRP SERPL-MCNC: <3 MG/L
EOSINOPHIL # BLD AUTO: 0.6 10E3/UL (ref 0–0.7)
EOSINOPHIL NFR BLD AUTO: 8 %
ERYTHROCYTE [DISTWIDTH] IN BLOOD BY AUTOMATED COUNT: 12.3 % (ref 10–15)
ERYTHROCYTE [SEDIMENTATION RATE] IN BLOOD BY WESTERGREN METHOD: 5 MM/HR (ref 0–20)
HCT VFR BLD AUTO: 41 % (ref 40–53)
HGB BLD-MCNC: 14.3 G/DL (ref 13.3–17.7)
IMM GRANULOCYTES # BLD: 0 10E3/UL
IMM GRANULOCYTES NFR BLD: 0 %
LYMPHOCYTES # BLD AUTO: 2.3 10E3/UL (ref 0.8–5.3)
LYMPHOCYTES NFR BLD AUTO: 32 %
MCH RBC QN AUTO: 31.4 PG (ref 26.5–33)
MCHC RBC AUTO-ENTMCNC: 34.9 G/DL (ref 31.5–36.5)
MCV RBC AUTO: 90 FL (ref 78–100)
MONOCYTES # BLD AUTO: 0.6 10E3/UL (ref 0–1.3)
MONOCYTES NFR BLD AUTO: 9 %
NEUTROPHILS # BLD AUTO: 3.5 10E3/UL (ref 1.6–8.3)
NEUTROPHILS NFR BLD AUTO: 50 %
NRBC # BLD AUTO: 0 10E3/UL
NRBC BLD AUTO-RTO: 0 /100
PLATELET # BLD AUTO: 168 10E3/UL (ref 150–450)
RBC # BLD AUTO: 4.55 10E6/UL (ref 4.4–5.9)
WBC # BLD AUTO: 7 10E3/UL (ref 4–11)

## 2023-03-21 PROCEDURE — 99214 OFFICE O/P EST MOD 30 MIN: CPT | Performed by: NURSE PRACTITIONER

## 2023-03-21 PROCEDURE — G0463 HOSPITAL OUTPT CLINIC VISIT: HCPCS

## 2023-03-21 PROCEDURE — 85652 RBC SED RATE AUTOMATED: CPT | Mod: ZL | Performed by: NURSE PRACTITIONER

## 2023-03-21 PROCEDURE — 86140 C-REACTIVE PROTEIN: CPT | Mod: ZL | Performed by: NURSE PRACTITIONER

## 2023-03-21 PROCEDURE — 36415 COLL VENOUS BLD VENIPUNCTURE: CPT | Mod: ZL | Performed by: NURSE PRACTITIONER

## 2023-03-21 PROCEDURE — 85025 COMPLETE CBC W/AUTO DIFF WBC: CPT | Mod: ZL | Performed by: NURSE PRACTITIONER

## 2023-03-21 RX ORDER — SPIRONOLACTONE 25 MG/1
12.5 TABLET ORAL DAILY
COMMUNITY
Start: 2023-01-13

## 2023-03-21 RX ORDER — ATORVASTATIN CALCIUM 10 MG/1
TABLET, FILM COATED ORAL
COMMUNITY
Start: 2022-11-07 | End: 2023-04-03

## 2023-03-21 RX ORDER — PREDNISONE 10 MG/1
TABLET ORAL
Qty: 15 TABLET | Refills: 0 | Status: SHIPPED | OUTPATIENT
Start: 2023-03-21 | End: 2023-03-28

## 2023-03-21 RX ORDER — LISINOPRIL 5 MG/1
1 TABLET ORAL
COMMUNITY
Start: 2022-12-28

## 2023-03-21 ASSESSMENT — PAIN SCALES - GENERAL: PAINLEVEL: SEVERE PAIN (6)

## 2023-03-21 NOTE — NURSING NOTE
"Chief Complaint   Patient presents with     Derm Problem     Rash on back starting two weeks ago       FOOD SECURITY SCREENING QUESTIONS  Hunger Vital Signs:  Within the past 12 months we worried whether our food would run out before we got money to buy more. Never  Within the past 12 months the food we bought just didn't last and we didn't have money to get more. Never  Ashley Luna LPN 3/21/2023 5:55 PM      Initial /82 (BP Location: Right arm, Patient Position: Sitting, Cuff Size: Adult Regular)   Pulse 73   Temp 97  F (36.1  C) (Tympanic)   Resp 16   Wt 75.3 kg (166 lb)   SpO2 97%   BMI 24.51 kg/m   Estimated body mass index is 24.51 kg/m  as calculated from the following:    Height as of 1/15/20: 1.753 m (5' 9\").    Weight as of this encounter: 75.3 kg (166 lb).  Medication Reconciliation: complete    Ashley Luna LPN  "

## 2023-03-21 NOTE — PROGRESS NOTES
ASSESSMENT/PLAN:     I have reviewed the nursing notes.  I have reviewed the findings, diagnosis, plan and need for follow up with the patient.      1. Rash of back    - CRP inflammation  - CBC and Differential  - Sedimentation Rate (ESR)  - predniSONE (DELTASONE) 10 MG tablet; Take 3 tablets (30 mg) by mouth daily for 3 days, THEN 2 tablets (20 mg) daily for 2 days, THEN 1 tablet (10 mg) daily for 2 days.  Dispense: 15 tablet; Refill: 0      Rash on back with spreading to bilateral sides of torso for the past 2 weeks.  Rash is both pruritic and painful.   No known etiology of rash.  Recommend follow up for possible biopsy if no improvement in rash with use of prednisone.    Ok to use OTC topical creams/ointments PRN    CBC - normal  CRP - normal, non-elevated  Sed Rate - normal, non-elevated          I explained my diagnostic considerations and recommendations to the patient, who voiced understanding and agreement with the treatment plan. All questions were answered. We discussed potential side effects of any prescribed or recommended therapies, as well as expectations for response to treatments.    Belen Rodriguez NP  [facility]      SUBJECTIVE:   Darrell Peterson is a 77 year old male who presents to clinic today for the following health issues:  Rash    HPI  Rash started 11 days ago.  States he stayed at a hotel for 4 days, he notified the hotel and the room was tested and negative for bed bugs.  Rash is very pruritic and becomes very painful with even light scratching.  Denies throat or lip swelling.  No difficulty swallowing.  No current or recent illness.  No fevers.  No new joint pains.  No new exposures or irritants.  Spouse without rash.  Remains active, works 3 days a week.  Tried some essential oils.  Used some OTC anti-itch lotions with decrease in pruritis.    Taking Tylenol BID         Past Medical History:   Diagnosis Date     Atherosclerotic heart disease of native coronary  artery without angina pectoris     ,Stents times 3     Other specified arthropod-borne viral fevers     2011     Past Surgical History:   Procedure Laterality Date     ANGIOPLASTY      , ~,and angioplasty     COLONOSCOPY  2009     COLONOSCOPY      1997     HEART CATH, ANGIOPLASTY      , ~,x3     HEART CATH, ANGIOPLASTY           OTHER SURGICAL HISTORY      , ,[id number],SCAN-ANGIOGRAM,Coronary angiography     OTHER SURGICAL HISTORY      ,[id number],SCAN-ANGIOGRAM,Coronary arteriography, follow-up angiography in Nov     OTHER SURGICAL HISTORY      10/10/2016,[id number],OTHER,N/A,[facility]     VASECTOMY           Social History     Tobacco Use     Smoking status: Former     Types: Cigarettes     Quit date: 1977     Years since quittin.1     Smokeless tobacco: Never   Substance Use Topics     Alcohol use: No     Alcohol/week: 0.0 standard drinks     Current Outpatient Medications   Medication Sig Dispense Refill     acetaminophen (TYLENOL) 325 MG tablet Take 650 mg by mouth every 4 hours as needed       aspirin 81 MG chewable tablet Take 81 mg by mouth daily       atorvastatin (LIPITOR) 20 MG tablet Take 20 mg by mouth daily       lisinopril (ZESTRIL) 5 MG tablet Take 1 tablet by mouth daily at 2 pm       magnesium 250 MG tablet Take 1 tablet by mouth daily       metoprolol succinate (TOPROL-XL) 25 MG 24 hr tablet Take 1 tablet by mouth daily       Multiple Vitamin (MULTI-VITAMINS) TABS Take 1 tablet by mouth daily       nitroGLYcerin (NITROSTAT) 0.4 MG sublingual tablet Place 1 tablet (0.4 mg) under the tongue every 5 minutes as needed for chest pain 25 tablet 0     Selenium 200 MCG TBCR        vitamin B-12 (CYANOCOBALAMIN) 1000 MCG CR tablet Take 1 tablet by mouth daily       atorvastatin (LIPITOR) 10 MG tablet TAKE 1 TABLET BY MOUTH ONCE DAILY WITH A 20 MG TABLET FOR A TOTAL OF 30 MG (Patient not taking: Reported on 3/21/2023)        spironolactone (ALDACTONE) 25 MG tablet Take 12.5 mg by mouth daily (Patient not taking: Reported on 3/21/2023)       No Known Allergies      Past medical history, past surgical history, current medications and allergies reviewed and accurate to the best of my knowledge.        OBJECTIVE:     /82 (BP Location: Right arm, Patient Position: Sitting, Cuff Size: Adult Regular)   Pulse 73   Temp 97  F (36.1  C) (Tympanic)   Resp 16   Wt 75.3 kg (166 lb)   SpO2 97%   BMI 24.51 kg/m    Body mass index is 24.51 kg/m .     Physical Exam  General Appearance: Well appearing elderly male, appropriate appearance for age. No acute distress  Respiratory: normal chest wall and respirations.  Normal effort. No cough appreciated.  Musculoskeletal:  Equal movement of bilateral upper extremities.  Equal movement of bilateral lower extremities.  Normal gait.    Dermatological: diffuse raised erythematous papular rash on entire back.  Bilateral sides of torso with erythematous based raised rash.  No vesicles or pustules.    No noted excoriation.  No bleeding.  See attached photos.  Psychological: normal affect, alert, oriented, and pleasant.                Labs:  Results for orders placed or performed in visit on 03/21/23   CRP inflammation     Status: Normal   Result Value Ref Range    CRP Inflammation <3.00 <5.00 mg/L   Sedimentation Rate (ESR)     Status: Normal   Result Value Ref Range    Erythrocyte Sedimentation Rate 5 0 - 20 mm/hr   CBC with platelets and differential     Status: None   Result Value Ref Range    WBC Count 7.0 4.0 - 11.0 10e3/uL    RBC Count 4.55 4.40 - 5.90 10e6/uL    Hemoglobin 14.3 13.3 - 17.7 g/dL    Hematocrit 41.0 40.0 - 53.0 %    MCV 90 78 - 100 fL    MCH 31.4 26.5 - 33.0 pg    MCHC 34.9 31.5 - 36.5 g/dL    RDW 12.3 10.0 - 15.0 %    Platelet Count 168 150 - 450 10e3/uL    % Neutrophils 50 %    % Lymphocytes 32 %    % Monocytes 9 %    % Eosinophils 8 %    % Basophils 1 %    % Immature Granulocytes  0 %    NRBCs per 100 WBC 0 <1 /100    Absolute Neutrophils 3.5 1.6 - 8.3 10e3/uL    Absolute Lymphocytes 2.3 0.8 - 5.3 10e3/uL    Absolute Monocytes 0.6 0.0 - 1.3 10e3/uL    Absolute Eosinophils 0.6 0.0 - 0.7 10e3/uL    Absolute Basophils 0.1 0.0 - 0.2 10e3/uL    Absolute Immature Granulocytes 0.0 <=0.4 10e3/uL    Absolute NRBCs 0.0 10e3/uL   CBC and Differential     Status: None    Narrative    The following orders were created for panel order CBC and Differential.  Procedure                               Abnormality         Status                     ---------                               -----------         ------                     CBC with platelets and d...[[id number]]                      Final result                 Please view results for these tests on the individual orders.

## 2023-04-03 ENCOUNTER — TELEPHONE (OUTPATIENT)
Dept: INTERNAL MEDICINE | Facility: OTHER | Age: 78
End: 2023-04-03

## 2023-04-03 ENCOUNTER — OFFICE VISIT (OUTPATIENT)
Dept: INTERNAL MEDICINE | Facility: OTHER | Age: 78
End: 2023-04-03
Attending: STUDENT IN AN ORGANIZED HEALTH CARE EDUCATION/TRAINING PROGRAM
Payer: COMMERCIAL

## 2023-04-03 VITALS
HEART RATE: 81 BPM | BODY MASS INDEX: 23.3 KG/M2 | DIASTOLIC BLOOD PRESSURE: 64 MMHG | SYSTOLIC BLOOD PRESSURE: 130 MMHG | WEIGHT: 157.8 LBS | RESPIRATION RATE: 16 BRPM | TEMPERATURE: 98.1 F | OXYGEN SATURATION: 99 %

## 2023-04-03 DIAGNOSIS — L30.9 DERMATITIS: Primary | ICD-10-CM

## 2023-04-03 DIAGNOSIS — L30.9 DERMATITIS: ICD-10-CM

## 2023-04-03 PROCEDURE — G0463 HOSPITAL OUTPT CLINIC VISIT: HCPCS

## 2023-04-03 PROCEDURE — 99203 OFFICE O/P NEW LOW 30 MIN: CPT | Performed by: STUDENT IN AN ORGANIZED HEALTH CARE EDUCATION/TRAINING PROGRAM

## 2023-04-03 RX ORDER — TRIAMCINOLONE ACETONIDE 1 MG/G
CREAM TOPICAL 2 TIMES DAILY
Qty: 453.6 G | Refills: 3 | Status: SHIPPED | OUTPATIENT
Start: 2023-04-03 | End: 2023-04-03

## 2023-04-03 RX ORDER — TRIAMCINOLONE ACETONIDE 1 MG/G
CREAM TOPICAL 2 TIMES DAILY
Qty: 453.6 G | Refills: 3 | Status: SHIPPED | OUTPATIENT
Start: 2023-04-03

## 2023-04-03 ASSESSMENT — PAIN SCALES - GENERAL: PAINLEVEL: MODERATE PAIN (5)

## 2023-04-03 NOTE — NURSING NOTE
"Chief Complaint   Patient presents with     Derm Problem     Rash on back   started beginning of March         Medication reconciliation completed.    FOOD SECURITY SCREENING QUESTIONS:    The next two questions are to help us understand your food security.  If you are feeling you need any assistance in this area, we have resources available to support you today.    Hunger Vital Signs:  Within the past 12 months we worried whether our food would run out before we got money to buy more. Never  Within the past 12 months the food we bought just didn't last and we didn't have money to get more. Never    Initial /64 (BP Location: Right arm, Patient Position: Sitting, Cuff Size: Adult Regular)   Pulse 81   Temp 98.1  F (36.7  C) (Temporal)   Resp 16   Wt 71.6 kg (157 lb 12.8 oz)   SpO2 99%   BMI 23.30 kg/m   Estimated body mass index is 23.3 kg/m  as calculated from the following:    Height as of 1/15/20: 1.753 m (5' 9\").    Weight as of this encounter: 71.6 kg (157 lb 12.8 oz).       Nina Gomez LPN .......  4/3/2023  9:43 AM  "

## 2023-04-03 NOTE — TELEPHONE ENCOUNTER
After verifying patient's name and date of birth, patient was given the below information.  Nina Gomez LPN....4/3/2023 4:24 PM

## 2023-04-03 NOTE — PROGRESS NOTES
Assessment & Plan         ICD-10-CM    1. Dermatitis  L30.9 Adult Dermatology Referral     triamcinolone (KENALOG) 0.1 % external cream        Patient with extensive dermatitis in the morbilliform pattern on his back erythematous appears to be allergic in nature.  No evidence of arthropod infection.  No vesicles to suspect shingles given the pain.  Suspect that he had an allergic reaction to the sheets at the motel and continues to apply multiple creams and essential oils which may be aggravating the lesion.  He was given triamcinolone instructed to stop all other oils and supplements to his back.  No further prednisone at this time due to side effects and it did not resolve the lesions.  Refer to dermatology.  No evidence of eosinophilia just suspect arthropod infection and no wounds or evidence of spread anywhere else.  No new medications or culprit medications to cause a rash.        No follow-ups on file.    Aime Ferrari MD  [facility] [place]      Rivka Ramírez is a 77 year old, presenting for the following health issues:  Derm Problem (Rash on back   started beginning of March  )         View : No data to display.              History of Present Illness       Reason for visit:  Rash on back  started about 3-23    He eats 2-3 servings of fruits and vegetables daily.He consumes 0 sweetened beverage(s) daily.He exercises with enough effort to increase his heart rate 9 or less minutes per day.  He exercises with enough effort to increase his heart rate 3 or less days per week.   He is taking medications regularly.       Rash on back   Started about the beginning of march     Took prednisone.   Over whole back and coming onto flanks.   Stings and is uncomfortable. Feels like a nerve pain.   Itches.   Uses goats milk lotion and a roll on pain medication.   Helps for about 2 hours.   Wife is a massuse. Uses essential oils and doing massages.   Went to a motel and stayed 3 nights then got  progressively worse as staying there.   After the 3rd night back got red.   Around 15th of march.         Review of Systems         Objective    /64 (BP Location: Right arm, Patient Position: Sitting, Cuff Size: Adult Regular)   Pulse 81   Temp 98.1  F (36.7  C) (Temporal)   Resp 16   Wt 71.6 kg (157 lb 12.8 oz)   SpO2 99%   BMI 23.30 kg/m    Body mass index is 23.3 kg/m .  Physical Exam   General: Pleasant 77-year-old man sitting clinic no acute distress  Erythematous morbilliform rash over the entirety of his back worse on his flanks and sides.   No scale no vesicles no bulla.  Similar to photograph from [facility].

## 2023-04-03 NOTE — TELEPHONE ENCOUNTER
Please send current prescription for triamcinolone to [facility]. Call patient once completed.     Jamila Napier on 4/3/2023 at 2:02 PM

## 2023-06-03 ENCOUNTER — HEALTH MAINTENANCE LETTER (OUTPATIENT)
Age: 78
End: 2023-06-03

## 2023-08-01 ENCOUNTER — OFFICE VISIT (OUTPATIENT)
Dept: FAMILY MEDICINE | Facility: OTHER | Age: 78
End: 2023-08-01
Attending: NURSE PRACTITIONER
Payer: COMMERCIAL

## 2023-08-01 VITALS
WEIGHT: 155.3 LBS | SYSTOLIC BLOOD PRESSURE: 90 MMHG | RESPIRATION RATE: 17 BRPM | OXYGEN SATURATION: 98 % | BODY MASS INDEX: 23 KG/M2 | HEIGHT: 69 IN | TEMPERATURE: 98 F | DIASTOLIC BLOOD PRESSURE: 57 MMHG | HEART RATE: 64 BPM

## 2023-08-01 DIAGNOSIS — R51.9 NONINTRACTABLE EPISODIC HEADACHE, UNSPECIFIED HEADACHE TYPE: Primary | ICD-10-CM

## 2023-08-01 DIAGNOSIS — R21 RASH: ICD-10-CM
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
## 2023-08-01 DIAGNOSIS — R53.83 OTHER FATIGUE: ICD-10-CM

## 2023-08-01 DIAGNOSIS — R68.89 SUSPECTED LYME DISEASE: ICD-10-CM

## 2023-08-01 LAB
ALBUMIN SERPL BCG-MCNC: 4 G/DL (ref 3.5–5.2)
ALP SERPL-CCNC: 75 U/L (ref 40–129)
ALT SERPL W P-5'-P-CCNC: 16 U/L (ref 0–70)
ANION GAP SERPL CALCULATED.3IONS-SCNC: 7 MMOL/L (ref 7–15)
AST SERPL W P-5'-P-CCNC: 22 U/L (ref 0–45)
BASOPHILS # BLD AUTO: 0.1 10E3/UL (ref 0–0.2)
BASOPHILS NFR BLD AUTO: 1 %
BILIRUB SERPL-MCNC: 0.7 MG/DL
BUN SERPL-MCNC: 15.5 MG/DL (ref 8–23)
CALCIUM SERPL-MCNC: 9.5 MG/DL (ref 8.8–10.2)
CHLORIDE SERPL-SCNC: 104 MMOL/L (ref 98–107)
CREAT SERPL-MCNC: 1.13 MG/DL (ref 0.67–1.17)
DEPRECATED HCO3 PLAS-SCNC: 29 MMOL/L (ref 22–29)
EOSINOPHIL # BLD AUTO: 0.3 10E3/UL (ref 0–0.7)
EOSINOPHIL NFR BLD AUTO: 4 %
ERYTHROCYTE [DISTWIDTH] IN BLOOD BY AUTOMATED COUNT: 11.9 % (ref 10–15)
GFR SERPL CREATININE-BSD FRML MDRD: 66 ML/MIN/1.73M2
GLUCOSE SERPL-MCNC: 79 MG/DL (ref 70–99)
HCT VFR BLD AUTO: 46.7 % (ref 40–53)
HGB BLD-MCNC: 15.7 G/DL (ref 13.3–17.7)
IMM GRANULOCYTES # BLD: 0 10E3/UL
IMM GRANULOCYTES NFR BLD: 0 %
LYMPHOCYTES # BLD AUTO: 1.8 10E3/UL (ref 0.8–5.3)
LYMPHOCYTES NFR BLD AUTO: 31 %
MCH RBC QN AUTO: 31.4 PG (ref 26.5–33)
MCHC RBC AUTO-ENTMCNC: 33.6 G/DL (ref 31.5–36.5)
MCV RBC AUTO: 93 FL (ref 78–100)
MONOCYTES # BLD AUTO: 0.6 10E3/UL (ref 0–1.3)
MONOCYTES NFR BLD AUTO: 10 %
NEUTROPHILS # BLD AUTO: 3.1 10E3/UL (ref 1.6–8.3)
NEUTROPHILS NFR BLD AUTO: 54 %
NRBC # BLD AUTO: 0 10E3/UL
NRBC BLD AUTO-RTO: 0 /100
PLATELET # BLD AUTO: 187 10E3/UL (ref 150–450)
POTASSIUM SERPL-SCNC: 4.6 MMOL/L (ref 3.4–5.3)
PROT SERPL-MCNC: 6.7 G/DL (ref 6.4–8.3)
RBC # BLD AUTO: 5 10E6/UL (ref 4.4–5.9)
SODIUM SERPL-SCNC: 140 MMOL/L (ref 136–145)
WBC # BLD AUTO: 5.8 10E3/UL (ref 4–11)

## 2023-08-01 PROCEDURE — 36415 COLL VENOUS BLD VENIPUNCTURE: CPT | Mod: ZL | Performed by: NURSE PRACTITIONER

## 2023-08-01 PROCEDURE — G0463 HOSPITAL OUTPT CLINIC VISIT: HCPCS

## 2023-08-01 PROCEDURE — 99214 OFFICE O/P EST MOD 30 MIN: CPT | Performed by: NURSE PRACTITIONER

## 2023-08-01 PROCEDURE — 87484 EHRLICHA CHAFFEENSIS AMP PRB: CPT | Mod: ZL | Performed by: NURSE PRACTITIONER

## 2023-08-01 PROCEDURE — 86618 LYME DISEASE ANTIBODY: CPT | Mod: ZL | Performed by: NURSE PRACTITIONER

## 2023-08-01 PROCEDURE — 85025 COMPLETE CBC W/AUTO DIFF WBC: CPT | Mod: ZL | Performed by: NURSE PRACTITIONER

## 2023-08-01 PROCEDURE — 87798 DETECT AGENT NOS DNA AMP: CPT | Mod: ZL | Performed by: NURSE PRACTITIONER

## 2023-08-01 PROCEDURE — 80053 COMPREHEN METABOLIC PANEL: CPT | Mod: ZL | Performed by: NURSE PRACTITIONER

## 2023-08-01 RX ORDER — DOXYCYCLINE 100 MG/1
100 CAPSULE ORAL 2 TIMES DAILY
Qty: 28 CAPSULE | Refills: 0 | Status: SHIPPED | OUTPATIENT
Start: 2023-08-01 | End: 2023-08-15

## 2023-08-01 ASSESSMENT — PAIN SCALES - GENERAL: PAINLEVEL: EXTREME PAIN (9)

## 2023-08-01 NOTE — NURSING NOTE
"Chief Complaint   Patient presents with    Sinus Problem     Pressure on left side of face above eyebrow.         Initial BP 90/57 (BP Location: Right arm, Patient Position: Sitting, Cuff Size: Adult Regular)   Pulse 64   Temp 98  F (36.7  C) (Tympanic)   Resp 17   Ht 1.753 m (5' 9\")   Wt 70.4 kg (155 lb 4.8 oz)   SpO2 98%   BMI 22.93 kg/m   Estimated body mass index is 22.93 kg/m  as calculated from the following:    Height as of this encounter: 1.753 m (5' 9\").    Weight as of this encounter: 70.4 kg (155 lb 4.8 oz).   Advance Care Directive on file? no  Advance Care Directive provided to patient?     FOOD SECURITY SCREENING QUESTIONS:    The next two questions are to help us understand your food security.  If you are feeling you need any assistance in this area, we have resources available to support you today.    Hunger Vital Signs:  Within the past 12 months we worried whether our food would run out before we got money to buy more. Never  Within the past 12 months the food we bought just didn't last and we didn't have money to get more. Never        Nithya Sunshine     "

## 2023-08-01 NOTE — PROGRESS NOTES
ASSESSMENT/PLAN:    I have reviewed the nursing notes.  I have reviewed the findings, diagnosis, plan and need for follow up with the patient.    1. Nonintractable episodic headache, unspecified headache type  2. Rash (resolved)   3. Fatigue  - CBC and Differential  - Comprehensive Metabolic Panel  - Lyme Disease Total Abs Bld with Reflex to Confirm CLIA  - Ehrlichia Anaplasma Sp by PCR  - Babesia Species by PCR    3. Tick bite, unspecified site, initial encounter  4. Suspected Lyme disease  - doxycycline hyclate (VIBRAMYCIN) 100 MG capsule; Take 1 capsule (100 mg) by mouth 2 times daily for 14 days  Dispense: 28 capsule; Refill: 0  High risk patient for tickborne illness and has had a couple likely deer tick bites in the last couple months.  Testing for tick panel testing and treating with doxycycline for suspected Lyme disease.  I did explain to him that if he did indeed have a sinus infection of the left maxillary and frontal sinuses this would be appropriate treatment as well.  However, I did also explain to him that his symptoms are not classic for acute sinusitis.  He is understanding of my treatment plan and recommendations.  CBC and CMP are normal today.  If he develops any change in condition or if his blood pressure remains low he is to follow-up emergently. He is also agreeable to this.  He is asymptomatic aside from fatigue of his hypotension at this time.  He does take metoprolol and lisinopril which she took this morning.    Discussed warning signs/symptoms indicative of need to f/u    Follow up if symptoms persist or worsen or concerns    I explained my diagnostic considerations and recommendations to the patient, who voiced understanding and agreement with the treatment plan. All questions were answered. We discussed potential side effects of any prescribed or recommended therapies, as well as expectations for response to treatments.    Bree Aponte NP  8/1/2023  10:59 AM    HPI:  Darrell Peterson  "is a 78 year old male who presents to [facility] today for concerns of left sided facial pressure and discomfort above the left eye brow. He has had some intermittent headaches, lingering. It has been ongoing for a couple of months. He does occasionally get migraine headaches from time to time. He thinks this may be sinus infection. Sometimes his eyes water but he does not really have any nasal drainage or congestion.     He has not tried a netti pot or flonase. He is not interested in a saline rinse.     He has not had a history of sinus infections in the past. No allergy problems. No visual changes. No fevers. Occasional tinnitus like a \"noisy motor.\" He rates the facial discomfort/pressure right now to be mild.     Denies chest pain and cough. No weakness. Had a triple bypass done in October of 2017.     5/10, can't ignore the discomfort on his left forehead above the eye. Chronic.     Has had a couple wood ticks. One was really small; thought it was maybe a deer tick. Was on him \" a while\" in an area he cannot easily see.     He had a random rash on his back a couple months ago.   No fevers.   Feels quite fatigued. Walks on trails in the woods couple times a week.     ROS otherwise negative.       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 angina pectoris     1996,Stents times 3    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     Past Surgical History:   Procedure Laterality Date    ANGIOPLASTY      9/96, 1996~1997,and angioplasty    COLONOSCOPY  06/04/2009 6/4/09    COLONOSCOPY      1997 Nov    HEART CATH, ANGIOPLASTY      9/96, 1996~1997,x3    HEART CATH, ANGIOPLASTY      1996    OTHER SURGICAL HISTORY      9/96, 1996~1997,[id number],SCAN-ANGIOGRAM,Coronary angiography    OTHER SURGICAL HISTORY      1996,[id number],SCAN-ANGIOGRAM,Coronary arteriography, follow-up angiography in Nov    OTHER SURGICAL HISTORY      " "10/10/2016,[id number],OTHER,N/A,[facility]    VASECTOMY      1982     Social History     Tobacco Use    Smoking status: Former     Types: Cigarettes     Quit date: 1977     Years since quittin.4    Smokeless tobacco: Never   Substance Use Topics    Alcohol use: No     Alcohol/week: 0.0 standard drinks of alcohol     Current Outpatient Medications   Medication Sig Dispense Refill    acetaminophen (TYLENOL) 325 MG tablet Take 650 mg by mouth every 4 hours as needed      aspirin 81 MG chewable tablet Take 81 mg by mouth daily      atorvastatin (LIPITOR) 20 MG tablet Take 20 mg by mouth daily      doxycycline hyclate (VIBRAMYCIN) 100 MG capsule Take 1 capsule (100 mg) by mouth 2 times daily for 14 days 28 capsule 0    lisinopril (ZESTRIL) 5 MG tablet Take 1 tablet by mouth daily at 2 pm      magnesium 250 MG tablet Take 1 tablet by mouth daily      metoprolol succinate (TOPROL-XL) 25 MG 24 hr tablet Take 1 tablet by mouth daily      Multiple Vitamin (MULTI-VITAMINS) TABS Take 1 tablet by mouth daily      nitroGLYcerin (NITROSTAT) 0.4 MG sublingual tablet Place 1 tablet (0.4 mg) under the tongue every 5 minutes as needed for chest pain 25 tablet 0    Selenium 200 MCG TBCR       spironolactone (ALDACTONE) 25 MG tablet Take 12.5 mg by mouth daily      triamcinolone (KENALOG) 0.1 % external cream Apply topically 2 times daily 453.6 g 3    vitamin B-12 (CYANOCOBALAMIN) 1000 MCG CR tablet Take 1 tablet by mouth daily       No Known Allergies  Past medical history, past surgical history, current medications and allergies reviewed and accurate to the best of my knowledge.      ROS:  Refer to HPI    BP 90/57 (BP Location: Right arm, Patient Position: Sitting, Cuff Size: Adult Regular)   Pulse 64   Temp 98  F (36.7  C) (Tympanic)   Resp 17   Ht 1.753 m (5' 9\")   Wt 70.4 kg (155 lb 4.8 oz)   SpO2 98%   BMI 22.93 kg/m      EXAM:  General Appearance: Well appearing 78 year old male, appropriate appearance for age. " No acute distress   Sinuses:  + left frontal sinus tenderness to palpation  Respiratory: normal chest wall and respirations.  Normal effort.  Clear to auscultation bilaterally, no wheezing, crackles or rhonchi.  No increased work of breathing.  No cough appreciated.  Cardiac: RRR with no murmurs  Musculoskeletal:  Equal movement of bilateral upper extremities.  Equal movement of bilateral lower extremities.  Normal gait.    Dermatological: no rashes noted of exposed skin  Neuro: Alert and oriented to person, place, and time.    Psychological: normal affect, alert, oriented, and pleasant.     Results for orders placed or performed in visit on 08/01/23   Comprehensive Metabolic Panel     Status: Normal   Result Value Ref Range    Sodium 140 136 - 145 mmol/L    Potassium 4.6 3.4 - 5.3 mmol/L    Chloride 104 98 - 107 mmol/L    Carbon Dioxide (CO2) 29 22 - 29 mmol/L    Anion Gap 7 7 - 15 mmol/L    Urea Nitrogen 15.5 8.0 - 23.0 mg/dL    Creatinine 1.13 0.67 - 1.17 mg/dL    Calcium 9.5 8.8 - 10.2 mg/dL    Glucose 79 70 - 99 mg/dL    Alkaline Phosphatase 75 40 - 129 U/L    AST 22 0 - 45 U/L    ALT 16 0 - 70 U/L    Protein Total 6.7 6.4 - 8.3 g/dL    Albumin 4.0 3.5 - 5.2 g/dL    Bilirubin Total 0.7 <=1.2 mg/dL    GFR Estimate 66 >60 mL/min/1.73m2   CBC with platelets and differential     Status: None   Result Value Ref Range    WBC Count 5.8 4.0 - 11.0 10e3/uL    RBC Count 5.00 4.40 - 5.90 10e6/uL    Hemoglobin 15.7 13.3 - 17.7 g/dL    Hematocrit 46.7 40.0 - 53.0 %    MCV 93 78 - 100 fL    MCH 31.4 26.5 - 33.0 pg    MCHC 33.6 31.5 - 36.5 g/dL    RDW 11.9 10.0 - 15.0 %    Platelet Count 187 150 - 450 10e3/uL    % Neutrophils 54 %    % Lymphocytes 31 %    % Monocytes 10 %    % Eosinophils 4 %    % Basophils 1 %    % Immature Granulocytes 0 %    NRBCs per 100 WBC 0 <1 /100    Absolute Neutrophils 3.1 1.6 - 8.3 10e3/uL    Absolute Lymphocytes 1.8 0.8 - 5.3 10e3/uL    Absolute Monocytes 0.6 0.0 - 1.3 10e3/uL    Absolute  Eosinophils 0.3 0.0 - 0.7 10e3/uL    Absolute Basophils 0.1 0.0 - 0.2 10e3/uL    Absolute Immature Granulocytes 0.0 <=0.4 10e3/uL    Absolute NRBCs 0.0 10e3/uL   CBC and Differential     Status: None    Narrative    The following orders were created for panel order CBC and Differential.  Procedure                               Abnormality         Status                     ---------                               -----------         ------                     CBC with platelets and d...[[id number]]                      Final result                 Please view results for these tests on the individual orders.

## 2023-08-02 LAB — B BURGDOR IGG+IGM SER QL: 0.03

## 2023-08-04 LAB
A PHAGOCYTOPH DNA BLD QL NAA+PROBE: NOT DETECTED
B MICROTI DNA BLD QL NAA+PROBE: NOT DETECTED
BABESIA DNA BLD QL NAA+PROBE: NOT DETECTED
E CHAFFEENSIS DNA BLD QL NAA+PROBE: NOT DETECTED
E EWINGII DNA SPEC QL NAA+PROBE: NOT DETECTED
EHRLICHIA DNA SPEC QL NAA+PROBE: NOT DETECTED

## 2023-08-16 ENCOUNTER — TELEPHONE (OUTPATIENT)
Dept: FAMILY MEDICINE | Facility: OTHER | Age: 78
End: 2023-08-16
Payer: COMMERCIAL

## 2023-08-16 NOTE — TELEPHONE ENCOUNTER
Patient came into clinic for results, after verifying last name and date of birth spoke with patient about providers note, patient verbalized understandings.   Montserrat Mercado LPN...................  8/16/2023   12:49 PM

## 2023-08-16 NOTE — TELEPHONE ENCOUNTER
Reason for call: Request for results.    Name of test or procedure: Lymes Disease    Date of test or procedure: last week     Location of test or procedure:     Preferred method for responding to this message: Telephone Call    Phone number patient can be reached at: Home number on file [phone] (home)    If we can't reach you directly, may we leave a detailed response at the number you provided?No

## 2024-07-13 ENCOUNTER — HEALTH MAINTENANCE LETTER (OUTPATIENT)
Age: 79
End: 2024-07-13

## 2024-09-18 ENCOUNTER — OFFICE VISIT (OUTPATIENT)
Dept: FAMILY MEDICINE | Facility: OTHER | Age: 79
End: 2024-09-18
Attending: FAMILY MEDICINE
Payer: COMMERCIAL

## 2024-09-18 VITALS
WEIGHT: 151.8 LBS | RESPIRATION RATE: 20 BRPM | TEMPERATURE: 97.6 F | DIASTOLIC BLOOD PRESSURE: 50 MMHG | BODY MASS INDEX: 22.42 KG/M2 | HEART RATE: 66 BPM | OXYGEN SATURATION: 95 % | SYSTOLIC BLOOD PRESSURE: 104 MMHG

## 2024-09-18 DIAGNOSIS — M48.062 SPINAL STENOSIS, LUMBAR REGION, WITH NEUROGENIC CLAUDICATION: Primary | ICD-10-CM

## 2024-09-18 DIAGNOSIS — M51.26 LUMBAR DISC HERNIATION: ICD-10-CM

## 2024-09-18 DIAGNOSIS — I25.118 CORONARY ARTERY DISEASE OF NATIVE ARTERY OF NATIVE HEART WITH STABLE ANGINA PECTORIS (H): ICD-10-CM

## 2024-09-18 PROCEDURE — G0463 HOSPITAL OUTPT CLINIC VISIT: HCPCS

## 2024-09-18 PROCEDURE — G2211 COMPLEX E/M VISIT ADD ON: HCPCS | Performed by: FAMILY MEDICINE

## 2024-09-18 PROCEDURE — 99214 OFFICE O/P EST MOD 30 MIN: CPT | Performed by: FAMILY MEDICINE

## 2024-09-18 RX ORDER — PREDNISONE 20 MG/1
TABLET ORAL
Qty: 20 TABLET | Refills: 0 | Status: SHIPPED | OUTPATIENT
Start: 2024-09-18

## 2024-09-18 RX ORDER — ATORVASTATIN CALCIUM 10 MG/1
10 TABLET, FILM COATED ORAL DAILY
COMMUNITY
Start: 2024-06-20

## 2024-09-18 ASSESSMENT — PAIN SCALES - GENERAL: PAINLEVEL: SEVERE PAIN (6)

## 2024-09-18 NOTE — NURSING NOTE
Patient here for low left back pain that makes the left leg very painful. Medication Reconciliation: complete.    No Suarez LPN  9/18/2024 2:23 PM

## 2024-09-19 ASSESSMENT — ENCOUNTER SYMPTOMS: BACK PAIN: 1

## 2024-09-19 NOTE — PROGRESS NOTES
SUBJECTIVE:   Darrell Petersno is a 79 year old male who presents to clinic today for the following health issues: Back pain    Patient arrives here for back pain.  He states that he has a pinched nerve rating down his left leg.  Starts in the buttocks goes down the back of the leg.  Then the lateral portion of the lower leg and into the foot.  He does report some weakness.  No changes in bowel or bladder habits.  Symptoms been going on for 3 months.  Getting worse.  He has tried Tylenol.  Because of his heart disease his cardiologist has told him to avoid ibuprofen but he has taken it on a rare basis.  Patient does have a history of lumbar stenosis.  Did get injected back in 2019 and did get good results with that.  Also reports that he got an injection in early part of the year.  But I do not see any reports in the chart.    Back Pain     History of Present Illness       Reason for visit:  Pinched disk in my back   He is taking medications regularly.        Patient Active Problem List    Diagnosis Date Noted    Lumbar disc herniation 09/18/2024     Priority: Medium    Chronic left-sided low back pain with sciatica 09/18/2019     Priority: Medium    Cerebrovascular accident (CVA) due to stenosis of precerebral artery (H) 03/12/2019     Priority: Medium     Collapse of small Vessel in back of his head (per his report)      Age-related nuclear cataract 02/20/2019     Priority: Medium    Acute bilateral low back pain with sciatica 09/26/2018     Priority: Medium    ACP (advance care planning) 06/08/2018     Priority: Medium    Atherosclerotic heart disease 01/17/2018     Priority: Medium    Hyperlipidemia 01/17/2018     Priority: Medium    Migraine headache 01/17/2018     Priority: Medium    Chronic systolic congestive heart failure (H) 11/07/2016     Priority: Medium    Coronary artery disease involving coronary bypass graft of native heart without angina pectoris 10/26/2016     Priority: Medium     Overview:    Three-vessel      Coronary artery disease of native artery of native heart with stable angina pectoris (H24) 10/10/2016     Priority: Medium    Cerebral infarction (H) 02/21/2014     Priority: Medium    Slowing of urinary stream 02/21/2014     Priority: Medium     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 angina pectoris     1996,Stents times 3    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      Past Surgical History:   Procedure Laterality Date    ANGIOPLASTY      9/96, 1996~1997,and angioplasty    COLONOSCOPY  06/04/2009 6/4/09    COLONOSCOPY      1997 Nov    HEART CATH, ANGIOPLASTY      9/96, 1996~1997,x3    HEART CATH, ANGIOPLASTY      1996    OTHER SURGICAL HISTORY      9/96, 1996~1997,[id number],SCAN-ANGIOGRAM,Coronary angiography    OTHER SURGICAL HISTORY      1996,[id number],SCAN-ANGIOGRAM,Coronary arteriography, follow-up angiography in Nov    OTHER SURGICAL HISTORY      10/10/2016,[id number],OTHER,N/A,[facility]    VASECTOMY      1982       Review of Systems   Musculoskeletal:  Positive for back pain.        OBJECTIVE:     /50   Pulse 66   Temp 97.6  F (36.4  C)   Resp 20   Wt 68.9 kg (151 lb 12.8 oz)   SpO2 95%   BMI 22.42 kg/m    Body mass index is 22.42 kg/m .  Physical Exam  Constitutional:       Appearance: Normal appearance.   Neurological:      Mental Status: He is alert.      Comments: Patient has a negative straight leg raise.  Can stand on toes and heels equally well.  Deep tendon reflexes at 2+ and equal         Diagnostic Test Results:  none     ASSESSMENT/PLAN:           ICD-10-CM    1. Spinal stenosis, lumbar region, with neurogenic claudication  M48.062 predniSONE (DELTASONE) 20 MG tablet     MR Lumbar Spine w/o Contrast      2. Coronary artery disease of native artery of native heart with stable angina pectoris (H24)  I25.118 atorvastatin (LIPITOR) 10 MG tablet      3. Lumbar disc herniation  M51.26        Trial of prednisone.  If no improvement MRI.  The longitudinal plan of care for the diagnosis(es)/condition(s) as documented were addressed during this visit. Due to the added complexity in care, I will continue to support Darrell in the subsequent management and with ongoing continuity of care.      Dariusz Flowers MD  [facility] AND [facility]

## 2024-09-25 ENCOUNTER — HOSPITAL ENCOUNTER (OUTPATIENT)
Dept: MRI IMAGING | Facility: OTHER | Age: 79
Discharge: HOME | End: 2024-09-25
Attending: FAMILY MEDICINE | Admitting: FAMILY MEDICINE
Payer: COMMERCIAL

## 2024-09-25 DIAGNOSIS — M48.062 SPINAL STENOSIS, LUMBAR REGION, WITH NEUROGENIC CLAUDICATION: ICD-10-CM

## 2024-09-25 PROCEDURE — 72148 MRI LUMBAR SPINE W/O DYE: CPT

## 2024-09-26 ENCOUNTER — OFFICE VISIT (OUTPATIENT)
Dept: FAMILY MEDICINE | Facility: OTHER | Age: 79
End: 2024-09-26
Attending: FAMILY MEDICINE
Payer: COMMERCIAL

## 2024-09-26 VITALS
SYSTOLIC BLOOD PRESSURE: 120 MMHG | OXYGEN SATURATION: 96 % | DIASTOLIC BLOOD PRESSURE: 58 MMHG | RESPIRATION RATE: 18 BRPM | WEIGHT: 154.4 LBS | HEART RATE: 64 BPM | BODY MASS INDEX: 22.8 KG/M2 | TEMPERATURE: 97.7 F

## 2024-09-26 DIAGNOSIS — M48.062 SPINAL STENOSIS, LUMBAR REGION, WITH NEUROGENIC CLAUDICATION: Primary | ICD-10-CM

## 2024-09-26 PROCEDURE — G0463 HOSPITAL OUTPT CLINIC VISIT: HCPCS

## 2024-09-26 PROCEDURE — G2211 COMPLEX E/M VISIT ADD ON: HCPCS | Performed by: FAMILY MEDICINE

## 2024-09-26 PROCEDURE — 99214 OFFICE O/P EST MOD 30 MIN: CPT | Performed by: FAMILY MEDICINE

## 2024-09-26 ASSESSMENT — PAIN SCALES - GENERAL: PAINLEVEL: MODERATE PAIN (4)

## 2024-09-26 NOTE — NURSING NOTE
Patient here for follow up MRI results. He is almost done with prednisone taper and says it has done nothing. Medication Reconciliation: complete.    No Suarez LPN  9/26/2024 3:26 PM

## 2024-09-27 PROBLEM — M48.062 SPINAL STENOSIS, LUMBAR REGION, WITH NEUROGENIC CLAUDICATION: Status: ACTIVE | Noted: 2024-09-27

## 2024-09-27 NOTE — PROGRESS NOTES
SUBJECTIVE:   Darrell Peterson is a 79 year old male who presents to clinic today for the following health issues: Follow-up back pain left leg pain    Patient arrives here for follow-up back pain left leg pain.  He reports no improvement since with the oral prednisone.  Is very interested in proceeding with an injection.  He would like the MRI reviewed.  The MRI did show worsening stenosis.  He reports he continues to have left leg pain.  No weakness.  No changes in bowel or bladder habits.    History of Present Illness       Reason for visit:  Pinched disk in my back   He is taking medications regularly.        Patient Active Problem List    Diagnosis Date Noted    Lumbar disc herniation 09/18/2024     Priority: Medium    Chronic left-sided low back pain with sciatica 09/18/2019     Priority: Medium    Cerebrovascular accident (CVA) due to stenosis of precerebral artery (H) 03/12/2019     Priority: Medium     Collapse of small Vessel in back of his head (per his report)      Age-related nuclear cataract 02/20/2019     Priority: Medium    Acute bilateral low back pain with sciatica 09/26/2018     Priority: Medium    ACP (advance care planning) 06/08/2018     Priority: Medium    Atherosclerotic heart disease 01/17/2018     Priority: Medium    Hyperlipidemia 01/17/2018     Priority: Medium    Migraine headache 01/17/2018     Priority: Medium    Chronic systolic congestive heart failure (H) 11/07/2016     Priority: Medium    Coronary artery disease involving coronary bypass graft of native heart without angina pectoris 10/26/2016     Priority: Medium     Overview:   Three-vessel      Coronary artery disease of native artery of native heart with stable angina pectoris (H) 10/10/2016     Priority: Medium    Cerebral infarction (H) 02/21/2014     Priority: Medium    Slowing of urinary stream 02/21/2014     Priority: Medium     Past Medical History:   Diagnosis Date    Atherosclerotic heart disease of native coronary  artery without angina pectoris     1996,Stents times 3    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      Past Surgical History:   Procedure Laterality Date    ANGIOPLASTY      9/96, 1996~1997,and angioplasty    COLONOSCOPY  06/04/2009 6/4/09    COLONOSCOPY      1997 Nov    HEART CATH, ANGIOPLASTY      9/96, 1996~1997,x3    HEART CATH, ANGIOPLASTY      1996    OTHER SURGICAL HISTORY      9/96, 1996~1997,[id number],SCAN-ANGIOGRAM,Coronary angiography    OTHER SURGICAL HISTORY      1996,[id number],SCAN-ANGIOGRAM,Coronary arteriography, follow-up angiography in Nov    OTHER SURGICAL HISTORY      10/10/2016,[id number],OTHER,N/A,[facility]    VASECTOMY      1982       Review of Systems     OBJECTIVE:     /58   Pulse 64   Temp 97.7  F (36.5  C)   Resp 18   Wt 70 kg (154 lb 6.4 oz)   SpO2 96%   BMI 22.80 kg/m    Body mass index is 22.8 kg/m .  Physical Exam  Neurological:      Comments: Able to walk on his toes and heels.  Gait was unremarkable.  Squatting was normal.  States he squats daily at home for exercising             ASSESSMENT/PLAN:           ICD-10-CM    1. Spinal stenosis, lumbar region, with neurogenic claudication  M48.062 XR Lumbar Sacral Transforaminal Inj Left      I discussed with the patient the MRI results.  In worsening stenosis.  Also discussed it he might not get as much improvement as he did last time because of the worsening stenosis.  I did offer a neurosurgery consult.  He states he would like to have her avoid surgery if at all possible.  Will proceed with transforaminal injection.  If no improvement consider neurosurgery consult  The longitudinal plan of care for the diagnosis(es)/condition(s) as documented were addressed during this visit. Due to the added complexity in care, I will continue to support Darrell in the subsequent management and with ongoing continuity of care.      Dariusz Flowers MD  [facility] AND [facility]

## 2024-10-14 ENCOUNTER — HOSPITAL ENCOUNTER (OUTPATIENT)
Dept: GENERAL RADIOLOGY | Facility: OTHER | Age: 79
Discharge: HOME | End: 2024-10-14
Attending: FAMILY MEDICINE | Admitting: FAMILY MEDICINE
Payer: COMMERCIAL

## 2024-10-14 DIAGNOSIS — M48.062 SPINAL STENOSIS, LUMBAR REGION, WITH NEUROGENIC CLAUDICATION: ICD-10-CM

## 2024-10-14 PROCEDURE — 255N000002 HC RX 255 OP 636: Performed by: RADIOLOGY

## 2024-10-14 PROCEDURE — 64483 NJX AA&/STRD TFRM EPI L/S 1: CPT

## 2024-10-14 PROCEDURE — 250N000009 HC RX 250: Performed by: RADIOLOGY

## 2024-10-14 PROCEDURE — 250N000011 HC RX IP 250 OP 636: Performed by: RADIOLOGY

## 2024-10-14 RX ORDER — LIDOCAINE HYDROCHLORIDE 10 MG/ML
2 INJECTION, SOLUTION INFILTRATION; PERINEURAL ONCE
Status: COMPLETED | OUTPATIENT
Start: 2024-10-14 | End: 2024-10-14

## 2024-10-14 RX ORDER — LIDOCAINE HYDROCHLORIDE 10 MG/ML
2 INJECTION, SOLUTION EPIDURAL; INFILTRATION; INTRACAUDAL; PERINEURAL ONCE
Status: COMPLETED | OUTPATIENT
Start: 2024-10-14 | End: 2024-10-14

## 2024-10-14 RX ORDER — DEXAMETHASONE SODIUM PHOSPHATE 10 MG/ML
10 INJECTION, SOLUTION INTRAMUSCULAR; INTRAVENOUS ONCE
Status: COMPLETED | OUTPATIENT
Start: 2024-10-14 | End: 2024-10-14

## 2024-10-14 RX ADMIN — DEXAMETHASONE SODIUM PHOSPHATE 10 MG: 10 INJECTION INTRAMUSCULAR; INTRAVENOUS at 10:08

## 2024-10-14 RX ADMIN — LIDOCAINE HYDROCHLORIDE 2 ML: 10 INJECTION, SOLUTION EPIDURAL; INFILTRATION; INTRACAUDAL; PERINEURAL at 10:08

## 2024-10-14 RX ADMIN — IOHEXOL 1 ML: 240 INJECTION, SOLUTION INTRATHECAL; INTRAVASCULAR; INTRAVENOUS; ORAL at 10:08

## 2024-10-14 RX ADMIN — LIDOCAINE HYDROCHLORIDE 2 ML: 10 INJECTION, SOLUTION INFILTRATION; PERINEURAL at 10:08

## 2025-01-09 ENCOUNTER — HOSPITAL ENCOUNTER (EMERGENCY)
Facility: OTHER | Age: 80
Discharge: HOME | End: 2025-01-09
Attending: FAMILY MEDICINE
Payer: COMMERCIAL

## 2025-01-09 VITALS
RESPIRATION RATE: 18 BRPM | HEIGHT: 69 IN | WEIGHT: 148 LBS | TEMPERATURE: 97.6 F | OXYGEN SATURATION: 98 % | HEART RATE: 52 BPM | SYSTOLIC BLOOD PRESSURE: 144 MMHG | BODY MASS INDEX: 21.92 KG/M2 | DIASTOLIC BLOOD PRESSURE: 82 MMHG

## 2025-01-09 DIAGNOSIS — M54.42 CHRONIC LEFT-SIDED LOW BACK PAIN WITH LEFT-SIDED SCIATICA: ICD-10-CM

## 2025-01-09 DIAGNOSIS — G89.29 CHRONIC LEFT-SIDED LOW BACK PAIN WITH LEFT-SIDED SCIATICA: ICD-10-CM

## 2025-01-09 PROCEDURE — 99284 EMERGENCY DEPT VISIT MOD MDM: CPT | Mod: 25

## 2025-01-09 PROCEDURE — 250N000011 HC RX IP 250 OP 636: Performed by: FAMILY MEDICINE

## 2025-01-09 PROCEDURE — 250N000013 HC RX MED GY IP 250 OP 250 PS 637: Performed by: FAMILY MEDICINE

## 2025-01-09 PROCEDURE — 96374 THER/PROPH/DIAG INJ IV PUSH: CPT

## 2025-01-09 PROCEDURE — 99284 EMERGENCY DEPT VISIT MOD MDM: CPT | Performed by: FAMILY MEDICINE

## 2025-01-09 RX ORDER — HYDROCODONE BITARTRATE AND ACETAMINOPHEN 5; 325 MG/1; MG/1
1 TABLET ORAL EVERY 6 HOURS PRN
Qty: 20 TABLET | Refills: 0 | Status: SHIPPED | OUTPATIENT
Start: 2025-01-09 | End: 2025-01-20

## 2025-01-09 RX ORDER — HYDROCODONE BITARTRATE AND ACETAMINOPHEN 5; 325 MG/1; MG/1
1 TABLET ORAL ONCE
Status: COMPLETED | OUTPATIENT
Start: 2025-01-09 | End: 2025-01-09

## 2025-01-09 RX ORDER — LIDOCAINE 4 G/G
1 PATCH TOPICAL ONCE
Status: DISCONTINUED | OUTPATIENT
Start: 2025-01-09 | End: 2025-01-09 | Stop reason: HOSPADM

## 2025-01-09 RX ADMIN — HYDROCODONE BITARTRATE AND ACETAMINOPHEN 1 TABLET: 5; 325 TABLET ORAL at 02:14

## 2025-01-09 RX ADMIN — LIDOCAINE 1 PATCH: 4 PATCH TOPICAL at 02:14

## 2025-01-09 RX ADMIN — HYDROMORPHONE HYDROCHLORIDE 1 MG: 1 INJECTION, SOLUTION INTRAMUSCULAR; INTRAVENOUS; SUBCUTANEOUS at 02:59

## 2025-01-09 ASSESSMENT — ACTIVITIES OF DAILY LIVING (ADL)
ADLS_ACUITY_SCORE: 41

## 2025-01-09 ASSESSMENT — ENCOUNTER SYMPTOMS: BACK PAIN: 1

## 2025-01-09 ASSESSMENT — COLUMBIA-SUICIDE SEVERITY RATING SCALE - C-SSRS
2. HAVE YOU ACTUALLY HAD ANY THOUGHTS OF KILLING YOURSELF IN THE PAST MONTH?: NO
6. HAVE YOU EVER DONE ANYTHING, STARTED TO DO ANYTHING, OR PREPARED TO DO ANYTHING TO END YOUR LIFE?: NO
1. IN THE PAST MONTH, HAVE YOU WISHED YOU WERE DEAD OR WISHED YOU COULD GO TO SLEEP AND NOT WAKE UP?: NO

## 2025-01-09 NOTE — ED PROVIDER NOTES
History     Chief Complaint   Patient presents with    Back Pain     The history is provided by the patient.     Darrell Peterson is a 79 year old male here by EMS from home with left sided low back pain. Onset was about 3:30 PM. His wife is a massage therapist but could not get him comfortable.  His pain is 10 of 10. No recent fall or injury. He did carry some water softener salt into the house and thinks that flared this up. He cannot walk so they called 911. He had ibuprofen at 6 PM, no Tylenol since yesterday morning.     He had his first episode of back pain about twenty years ago when he threw open a garage door. He was hospitalized six days at that time. He has an appointment with Dr Adamson orthopedic spine January 23 in [place] to talk about options.    MRI from September 2024:  Worsening severe central canal stenosis at L4-5 with compression of the thecal sac and its contents secondary to progressive hypertrophic changes of the facet joints and ligamentum flavum.  Additional findings at L4-5 suggests a synovial cyst arising from the left facet joint, contributing to central canal stenosis and  progressive mass effect upon the thecal sac.   Degenerative changes at L3-4 and L5-S1 are similar in appearance  compared to the prior study as described above.    Allergies:  No Known Allergies    Problem List:    Patient Active Problem List    Diagnosis Date Noted    Spinal stenosis, lumbar region, with neurogenic claudication 09/27/2024     Priority: Medium    Lumbar disc herniation 09/18/2024     Priority: Medium    Chronic left-sided low back pain with sciatica 09/18/2019     Priority: Medium    Cerebrovascular accident (CVA) due to stenosis of precerebral artery (H) 03/12/2019     Priority: Medium     Collapse of small Vessel in back of his head (per his report)      Age-related nuclear cataract 02/20/2019     Priority: Medium    Acute bilateral low back pain with sciatica 09/26/2018     Priority: Medium    ACP  (advance care planning) 2018     Priority: Medium    Atherosclerotic heart disease 2018     Priority: Medium    Hyperlipidemia 2018     Priority: Medium    Migraine headache 2018     Priority: Medium    Chronic systolic congestive heart failure (H) 2016     Priority: Medium    Coronary artery disease involving coronary bypass graft of native heart without angina pectoris 10/26/2016     Priority: Medium     Overview:   Three-vessel      Coronary artery disease of native artery of native heart with stable angina pectoris 10/10/2016     Priority: Medium    Cerebral infarction (H) 2014     Priority: Medium    Slowing of urinary stream 2014     Priority: Medium        Past Medical History:    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 angina pectoris     Other specified arthropod-borne viral fevers        Past Surgical History:    Past Surgical History:   Procedure Laterality Date    ANGIOPLASTY      , ~,and angioplasty    COLONOSCOPY  2009    COLONOSCOPY       Nov    HEART CATH, ANGIOPLASTY      , ,x3    HEART CATH, ANGIOPLASTY          OTHER SURGICAL HISTORY      , ,[id number],SCAN-ANGIOGRAM,Coronary angiography    OTHER SURGICAL HISTORY      ,[id number],SCAN-ANGIOGRAM,Coronary arteriography, follow-up angiography in Nov    OTHER SURGICAL HISTORY      10/10/2016,[id number],OTHER,N/A,[facility]    VASECTOMY             Family History:    Family History   Problem Relation Age of Onset    Other - See Comments Mother          at 99    Other - See Comments Father         COPD, hx of smoking    Other - See Comments Brother          of stabbing    Other - See Comments Brother         no heart problems or hyperlipidemia    Other - See Comments Sister     Hyperlipidemia Sister         Hyperlipidemia,70 yrs old, hyperlipidemia    Heart Disease Other         " Heart Disease,MI       Social History:  Marital Status:   [2]  Social History     Tobacco Use    Smoking status: Former     Current packs/day: 0.00     Types: Cigarettes     Quit date: 1977     Years since quittin.9    Smokeless tobacco: Never   Vaping Use    Vaping status: Never Used   Substance Use Topics    Alcohol use: No     Alcohol/week: 0.0 standard drinks of alcohol    Drug use: No        Medications:    acetaminophen (TYLENOL) 325 MG tablet  aspirin 81 MG chewable tablet  atorvastatin (LIPITOR) 10 MG tablet  atorvastatin (LIPITOR) 20 MG tablet  HYDROcodone-acetaminophen (NORCO) 5-325 MG tablet  lisinopril (ZESTRIL) 5 MG tablet  magnesium 250 MG tablet  metoprolol succinate (TOPROL-XL) 25 MG 24 hr tablet  Multiple Vitamin (MULTI-VITAMINS) TABS  nitroGLYcerin (NITROSTAT) 0.4 MG sublingual tablet  predniSONE (DELTASONE) 20 MG tablet  Selenium 200 MCG TBCR  spironolactone (ALDACTONE) 25 MG tablet  triamcinolone (KENALOG) 0.1 % external cream  vitamin B-12 (CYANOCOBALAMIN) 1000 MCG CR tablet      Review of Systems   Musculoskeletal:  Positive for back pain.   All other systems reviewed and are negative.      Physical Exam   BP: (!) 169/115  Pulse: 61  Temp: 97.6  F (36.4  C)  Resp: 18  Height: 175.3 cm (5' 9\")  Weight: 67.1 kg (148 lb)  SpO2: 100 %      Physical Exam  Vitals and nursing note reviewed.   Constitutional:       General: He is not in acute distress.     Appearance: He is ill-appearing.   Pulmonary:      Effort: Pulmonary effort is normal. No respiratory distress.   Skin:     General: Skin is warm and dry.   Neurological:      General: No focal deficit present.      Mental Status: He is alert and oriented to person, place, and time.         No results found for this or any previous visit (from the past 24 hours).    Medications   Lidocaine (LIDOCARE) 4 % Patch 1 patch (1 patch Transdermal $Patch/Med Applied 25 6027)   HYDROmorphone (DILAUDID) injection 1 mg (1 mg Intravenous " "$Given 1/9/25 7716)   HYDROcodone-acetaminophen (NORCO) 5-325 MG per tablet 1 tablet (1 tablet Oral $Given 1/9/25 4272)       Assessments & Plan (with Medical Decision Making)  Darrell Peterson is a 79 year old male here by EMS from home with left sided low back pain. Onset was about 3:30 PM. His wife is a massage therapist but could not get him comfortable.  His pain is 10 of 10. No recent fall or injury. He did carry some water softener salt into the house and thinks that flared this up. He cannot walk so they called 911. He had ibuprofen at 6 PM, no Tylenol since yesterday morning.   He had his first episode of back pain about twenty years ago when he threw open a garage door. He was hospitalized six days at that time.   MRI from September 2024:  Worsening severe central canal stenosis at L4-5 with compression of the thecal sac and its contents secondary to progressive hypertrophic changes of the facet joints and ligamentum flavum.  Additional findings at L4-5 suggests a synovial cyst arising from the left facet joint, contributing to central canal stenosis and progressive mass effect upon the thecal sac. Degenerative changes at L3-4 and L5-S1 are similar in appearance compared to the prior study as described above.  VS in the ED BP (!) 144/82   Pulse 52   Temp 97.6  F (36.4  C) (Tympanic)   Resp 18   Ht 1.753 m (5' 9\")   Wt 67.1 kg (148 lb)   SpO2 98%   BMI 21.86 kg/m     He looks stiff and uncomfortable.   We gave Vicodin and placed a lidocaine patch.  4:02 AM  His pain is better. He would like to rest a bit and we can re-evaluate in an hour or so.  5:34 AM  He would like to go home.        I have reviewed the nursing notes.    I have reviewed the findings, diagnosis, plan and need for follow up with the patient.  Medical Decision Making  The patient's presentation was of moderate complexity (a chronic illness mild to moderate exacerbation, progression, or side effect of treatment).    The patient's " evaluation involved:  history and exam without other MDM data elements    The patient's management necessitated moderate risk (prescription drug management including medications given in the ED) and high risk (a parenteral controlled substance).        New Prescriptions    HYDROCODONE-ACETAMINOPHEN (NORCO) 5-325 MG TABLET    Take 1 tablet by mouth every 6 hours as needed for severe pain.       Final diagnoses:   Chronic left-sided low back pain with left-sided sciatica       1/9/2025   [facility] AND [facility], Renan Pinedo MD  01/09/25 0567

## 2025-01-09 NOTE — DISCHARGE INSTRUCTIONS
Do not drive or operate machinery for six hours after taking this medicine. This medicine will make you more likely to fall so please be careful.  Do not use firearms for six hours after taking this medicine.     Thank you for choosing our Emergency Department for your care.     You may receive a phone call or letter for a survey about your care in our ED.  Please complete this as this is how we improve care for our patients.     If you have any questions after leaving the ED you can call or text me on my cell phone at [phone].  I am not on call so if I do not answer my phone please leave a message- I will get back to you.  If you are not doing well please return to the ED.     Sincerely,    Dr Gianluca Godoy M.D.  Medical Director  [facility] Emergency Department

## 2025-01-09 NOTE — PROGRESS NOTES
Pt resting in room, no complaints reported.  Julienne Zimmer RN.............................1/9/2025 4:19 AM

## 2025-01-09 NOTE — ED TRIAGE NOTES
"Pt presents via EMS from home for severe low back pain. Pt having 10/10 left sided low back pain, not managed by tylenol/ibuprofen, does report recently was carrying some heavy bags of salt into his home and could have aggravated an existing back issue. No pain meds given en route.     BP (!) 169/115   Pulse 61   Temp 97.6  F (36.4  C) (Tympanic)   Resp 18   Ht 1.753 m (5' 9\")   Wt 67.1 kg (148 lb)   SpO2 100%   BMI 21.86 kg/m         Triage Assessment (Adult)       Row Name 01/09/25 0159          Triage Assessment    Airway WDL WDL        Respiratory WDL    Respiratory WDL WDL        Skin Circulation/Temperature WDL    Skin Circulation/Temperature WDL WDL        Cardiac WDL    Cardiac WDL WDL        Peripheral/Neurovascular WDL    Peripheral Neurovascular WDL WDL        Cognitive/Neuro/Behavioral WDL    Cognitive/Neuro/Behavioral WDL WDL                     "

## 2025-01-14 ENCOUNTER — TRANSFERRED RECORDS (OUTPATIENT)
Dept: HEALTH INFORMATION MANAGEMENT | Facility: OTHER | Age: 80
End: 2025-01-14
Payer: COMMERCIAL

## 2025-01-20 ENCOUNTER — OFFICE VISIT (OUTPATIENT)
Dept: FAMILY MEDICINE | Facility: OTHER | Age: 80
End: 2025-01-20
Attending: PHYSICIAN ASSISTANT
Payer: COMMERCIAL

## 2025-01-20 VITALS
BODY MASS INDEX: 23.18 KG/M2 | RESPIRATION RATE: 16 BRPM | WEIGHT: 157 LBS | SYSTOLIC BLOOD PRESSURE: 108 MMHG | HEART RATE: 60 BPM | DIASTOLIC BLOOD PRESSURE: 60 MMHG | TEMPERATURE: 97.3 F

## 2025-01-20 DIAGNOSIS — M48.062 SPINAL STENOSIS, LUMBAR REGION, WITH NEUROGENIC CLAUDICATION: ICD-10-CM

## 2025-01-20 DIAGNOSIS — M51.26 LUMBAR DISC HERNIATION: Primary | ICD-10-CM

## 2025-01-20 DIAGNOSIS — I50.40 COMBINED SYSTOLIC AND DIASTOLIC CONGESTIVE HEART FAILURE, UNSPECIFIED HF CHRONICITY (H): ICD-10-CM

## 2025-01-20 PROCEDURE — G0463 HOSPITAL OUTPT CLINIC VISIT: HCPCS

## 2025-01-20 PROCEDURE — G2211 COMPLEX E/M VISIT ADD ON: HCPCS | Performed by: PHYSICIAN ASSISTANT

## 2025-01-20 PROCEDURE — 99214 OFFICE O/P EST MOD 30 MIN: CPT | Performed by: PHYSICIAN ASSISTANT

## 2025-01-20 RX ORDER — MELOXICAM 7.5 MG/1
7.5 TABLET ORAL DAILY
Status: CANCELLED | OUTPATIENT
Start: 2025-01-20

## 2025-01-20 RX ORDER — HYDROCODONE BITARTRATE AND ACETAMINOPHEN 5; 325 MG/1; MG/1
1 TABLET ORAL EVERY 6 HOURS PRN
Qty: 60 TABLET | Refills: 0 | Status: SHIPPED | OUTPATIENT
Start: 2025-01-20

## 2025-01-20 ASSESSMENT — PAIN SCALES - PAIN ENJOYMENT GENERAL ACTIVITY SCALE (PEG)
INTERFERED_GENERAL_ACTIVITY: 10
INTERFERED_ENJOYMENT_LIFE: 10 - COMPLETELY INTERFERES
PEG_TOTALSCORE: 9.33
INTERFERED_GENERAL_ACTIVITY: 10 - COMPLETELY INTERFERES
INTERFERED_ENJOYMENT_LIFE: 10
AVG_PAIN_PASTWEEK: 8
PEG_TOTALSCORE: 9.33
AVG_PAIN_PASTWEEK: 8

## 2025-01-20 ASSESSMENT — PAIN SCALES - GENERAL: PAINLEVEL_OUTOF10: SEVERE PAIN (10)

## 2025-01-20 NOTE — NURSING NOTE
"Patient presents to the clinic for ER follow up.    FOOD SECURITY SCREENING QUESTIONS:    The next two questions are to help us understand your food security.  If you are feeling you need any assistance in this area, we have resources available to support you today.    Hunger Vital Signs:  Within the past 12 months we worried whether our food would run out before we got money to buy more. Never  Within the past 12 months the food we bought just didn't last and we didn't have money to get more. Never    Advance Care Directive on file? yes  Advance Care Directive provided to patient? Declined.      Chief Complaint   Patient presents with    Clinic Care Coordination - Follow-up     ER    Musculoskeletal Problem       Initial /60 (BP Location: Right arm, Patient Position: Sitting, Cuff Size: Adult Regular)   Pulse 60   Temp 97.3  F (36.3  C) (Temporal)   Resp 16   Wt 71.2 kg (157 lb)   BMI 23.18 kg/m   Estimated body mass index is 23.18 kg/m  as calculated from the following:    Height as of 1/9/25: 1.753 m (5' 9\").    Weight as of this encounter: 71.2 kg (157 lb).  Medication Reconciliation: complete        Irma Marrufo LPN     "

## 2025-01-20 NOTE — PROGRESS NOTES
Assessment & Plan       ICD-10-CM    1. Lumbar disc herniation  M51.26 HYDROcodone-acetaminophen (NORCO) 5-325 MG tablet      2. Spinal stenosis, lumbar region, with neurogenic claudication  M48.062 HYDROcodone-acetaminophen (NORCO) 5-325 MG tablet      3. Combined systolic and diastolic congestive heart failure, unspecified HF chronicity (H)  I50.40         Lumbar disc herniation, recommend ongoing close follow-up with orthopedics team.  In the process of scheduling surgery, however, unfortunately this is being hung up in the insurance authorization process.  I did ask Darrell that once his surgery date is selected that he reach out to schedule a preoperative history and physical as he will need this prior to his surgery date.  Recommend to continue with exercise, heat, ice, topical therapies (IcyHot, Bengay, Voltaren, etc.), TENS unit.  I did refill hydrocodone, he will not drive, operate machinery or consume alcohol with use of medication.  Return precautions reviewed extensively.  Spinal stenosis of lumbar region, see #1.  Offered prescription for long-acting anti-inflammatory such as Celebrex, meloxicam, etc.  This was declined by Darrell due to side effect concerns.   Combined systolic and diastolic heart failure, recommend to closely monitor daily weights.  Recommend low-salt diet.  No signs of exacerbation.  Return precautions reviewed.    The longitudinal plan of care for the diagnosis(es)/condition(s) as documented were addressed during this visit. Due to the added complexity in care, I will continue to support Darrell in the subsequent management and with ongoing continuity of care.    See Patient Instructions    No follow-ups on file.    Subjective   Darrell is a 79 year old, presenting for the following health issues:  Clinic Care Coordination - Follow-up (ER) and Musculoskeletal Problem        1/20/2025    10:49 AM   Additional Questions   Roomed by aida phelan lpn   Accompanied by spouse     History of Present  Illness       Back Pain:  He presents for follow up of back pain. Patient's back pain is a chronic problem.  Location of back pain:  Right lower back and left lower back  Description of back pain: dull ache, sharp and shooting  Back pain spreads: left buttocks and left foot    Since patient first noticed back pain, pain is: gradually worsening  Does back pain interfere with his job:  Not applicable       He eats 2-3 servings of fruits and vegetables daily.He consumes 1 sweetened beverage(s) daily.He exercises with enough effort to increase his heart rate 10 to 19 minutes per day.  He exercises with enough effort to increase his heart rate 4 days per week.   He is taking medications regularly.     Darrell presents to the clinic today for back pain. He was seen in the ER on 1/9/25 by Dr. Walt MD. He carried in some water softener salt and believes this flared up his pain. No falls, injuries, loss of bowel/bladder function and/or saddle anesthesia. MRI from 09/2024 showing worsening of severe central canal stenosis at L4-L5 with compression of the thecal sac secondary to hypertrophic changes of facet joints and ligamentum flavum. + probable synovial cyst at L4-L5.     Discharged home on Hydrocodone, taking 1/2 tablet.     1/14/25 - Dr. Adamson recommending L4-L5 laminectomy.     Lumbar ANETA injection 10/14/24.     ED/UC Followup:    Facility:  [facility] ER  Date of visit: 1-9-25  Reason for visit: back pain  Current Status: worse    Today, reports he is taking Hydrocodone 1/2 tablet every 3 hours. Taking a full tablet makes him drowsy.  Continues to exercise approximately 3 days a week plus walking.  Unfortunately, he has been working on scheduling his surgery, however, this is found up in the insurance authorization process.  He continues to have low back pain that radiates into his bilateral legs.  He declines any loss of bowel or bladder function and/or saddle anesthesia.  No new falls, injuries and/or trauma.  He does  not take any additional Tylenol.  He is utilizing ibuprofen as needed (unsure of how many times per day he is taking).    Review of Systems  Constitutional, HEENT, cardiovascular, pulmonary, GI, , musculoskeletal, neuro, skin, endocrine and psych systems are negative, except as otherwise noted.        Objective    /60 (BP Location: Right arm, Patient Position: Sitting, Cuff Size: Adult Regular)   Pulse 60   Temp 97.3  F (36.3  C) (Temporal)   Resp 16   Wt 71.2 kg (157 lb)   BMI 23.18 kg/m    Body mass index is 23.18 kg/m .  Physical Exam   GENERAL: alert and no distress  EYES: Eyes grossly normal to inspection  RESP: lungs clear to auscultation - no rales, rhonchi or wheezes  CV: regular rate and rhythm, normal S1 S2, no S3 or S4, no murmur, click or rub, no peripheral edema  MS:   Thoracic/Lumbar Spine:  Inspection:    Lordosis: Normal   Kyphosis: Increased  Tenderness: left para lumbar muscles, right para lumbar muscles  ROM: limited range of motion in all planes based off pain  Strength: gastrocsoleus   5/5, hamstrings  5/5, quadriceps  5/5, tibialis anterior  5/5, peroneals  5/5  Special Test: positive left straight leg raise, positive right straight leg raise  SKIN: no suspicious lesions or rashes  NEURO: Normal strength and tone, sensory exam grossly normal, and mentation intact  PSYCH: mentation appears normal, affect normal/bright    Lumbar MRI 9/25/24 per Dr. Alexandro MD.   Worsening severe central canal stenosis at L4-5 with compression of  the thecal sac and its contents secondary to progressive hypertrophic  changes of the facet joints and ligamentum flavum.Additional findings at L4-5 suggests a synovial cyst arising from the left facet joint, contributing to central canal stenosis and progressive mass effect upon the thecal sac.Degenerative changes at L3-4 and L5-S1 are similar in appearance  compared to the prior study as described above.    Signed Electronically by: Marcela Hoskins PA-C

## 2025-01-21 ENCOUNTER — OFFICE VISIT (OUTPATIENT)
Dept: FAMILY MEDICINE | Facility: OTHER | Age: 80
End: 2025-01-21
Attending: FAMILY MEDICINE
Payer: COMMERCIAL

## 2025-01-21 VITALS
DIASTOLIC BLOOD PRESSURE: 63 MMHG | OXYGEN SATURATION: 98 % | SYSTOLIC BLOOD PRESSURE: 101 MMHG | HEART RATE: 64 BPM | RESPIRATION RATE: 16 BRPM | HEIGHT: 69 IN | WEIGHT: 152.4 LBS | TEMPERATURE: 97.6 F | BODY MASS INDEX: 22.57 KG/M2

## 2025-01-21 DIAGNOSIS — Z01.818 PREOP GENERAL PHYSICAL EXAM: Primary | ICD-10-CM

## 2025-01-21 DIAGNOSIS — M48.062 SPINAL STENOSIS, LUMBAR REGION, WITH NEUROGENIC CLAUDICATION: ICD-10-CM

## 2025-01-21 DIAGNOSIS — I25.118 CORONARY ARTERY DISEASE OF NATIVE ARTERY OF NATIVE HEART WITH STABLE ANGINA PECTORIS: ICD-10-CM

## 2025-01-21 DIAGNOSIS — I50.40 COMBINED SYSTOLIC AND DIASTOLIC CONGESTIVE HEART FAILURE, UNSPECIFIED HF CHRONICITY (H): ICD-10-CM

## 2025-01-21 LAB
ANION GAP SERPL CALCULATED.3IONS-SCNC: 10 MMOL/L (ref 7–15)
BASOPHILS # BLD AUTO: 0.1 10E3/UL (ref 0–0.2)
BASOPHILS NFR BLD AUTO: 1 %
BUN SERPL-MCNC: 17.4 MG/DL (ref 8–23)
CALCIUM SERPL-MCNC: 9.1 MG/DL (ref 8.8–10.4)
CHLORIDE SERPL-SCNC: 102 MMOL/L (ref 98–107)
CREAT SERPL-MCNC: 1.07 MG/DL (ref 0.67–1.17)
EGFRCR SERPLBLD CKD-EPI 2021: 70 ML/MIN/1.73M2
EOSINOPHIL # BLD AUTO: 0.1 10E3/UL (ref 0–0.7)
EOSINOPHIL NFR BLD AUTO: 1 %
ERYTHROCYTE [DISTWIDTH] IN BLOOD BY AUTOMATED COUNT: 11.9 % (ref 10–15)
GLUCOSE SERPL-MCNC: 93 MG/DL (ref 70–99)
HCO3 SERPL-SCNC: 27 MMOL/L (ref 22–29)
HCT VFR BLD AUTO: 44.5 % (ref 40–53)
HGB BLD-MCNC: 14.9 G/DL (ref 13.3–17.7)
IMM GRANULOCYTES # BLD: 0 10E3/UL
IMM GRANULOCYTES NFR BLD: 0 %
LYMPHOCYTES # BLD AUTO: 2 10E3/UL (ref 0.8–5.3)
LYMPHOCYTES NFR BLD AUTO: 28 %
MCH RBC QN AUTO: 31.3 PG (ref 26.5–33)
MCHC RBC AUTO-ENTMCNC: 33.5 G/DL (ref 31.5–36.5)
MCV RBC AUTO: 94 FL (ref 78–100)
MONOCYTES # BLD AUTO: 0.8 10E3/UL (ref 0–1.3)
MONOCYTES NFR BLD AUTO: 11 %
NEUTROPHILS # BLD AUTO: 4.1 10E3/UL (ref 1.6–8.3)
NEUTROPHILS NFR BLD AUTO: 59 %
NRBC # BLD AUTO: 0 10E3/UL
NRBC BLD AUTO-RTO: 0 /100
PLATELET # BLD AUTO: 179 10E3/UL (ref 150–450)
POTASSIUM SERPL-SCNC: 4.6 MMOL/L (ref 3.4–5.3)
RBC # BLD AUTO: 4.76 10E6/UL (ref 4.4–5.9)
SODIUM SERPL-SCNC: 139 MMOL/L (ref 135–145)
WBC # BLD AUTO: 7.1 10E3/UL (ref 4–11)

## 2025-01-21 PROCEDURE — 85025 COMPLETE CBC W/AUTO DIFF WBC: CPT | Mod: ZL | Performed by: FAMILY MEDICINE

## 2025-01-21 PROCEDURE — 80048 BASIC METABOLIC PNL TOTAL CA: CPT | Mod: ZL | Performed by: FAMILY MEDICINE

## 2025-01-21 PROCEDURE — 36415 COLL VENOUS BLD VENIPUNCTURE: CPT | Mod: ZL | Performed by: FAMILY MEDICINE

## 2025-01-21 PROCEDURE — G0463 HOSPITAL OUTPT CLINIC VISIT: HCPCS

## 2025-01-21 ASSESSMENT — PAIN SCALES - GENERAL: PAINLEVEL_OUTOF10: NO PAIN (0)

## 2025-01-21 NOTE — NURSING NOTE
"Chief Complaint   Patient presents with    Pre-Op Exam       Initial /63 (BP Location: Right arm, Patient Position: Sitting, Cuff Size: Adult Regular)   Pulse 64   Temp 97.6  F (36.4  C) (Tympanic)   Resp 16   Ht 1.753 m (5' 9\")   Wt 69.1 kg (152 lb 6.4 oz)   SpO2 98%   BMI 22.51 kg/m   Estimated body mass index is 22.51 kg/m  as calculated from the following:    Height as of this encounter: 1.753 m (5' 9\").    Weight as of this encounter: 69.1 kg (152 lb 6.4 oz).  Medication Review: complete    The next two questions are to help us understand your food security.  If you are feeling you need any assistance in this area, we have resources available to support you today.          1/21/2025   SDOH- Food Insecurity   Within the past 12 months, did you worry that your food would run out before you got money to buy more? N   Within the past 12 months, did the food you bought just not last and you didn t have money to get more? N         Health Care Directive:  Patient does not have a Health Care Directive: Discussed advance care planning with patient; however, patient declined at this time.    Chelsi Zimmer      "

## 2025-01-21 NOTE — PATIENT INSTRUCTIONS
How to Take Your Medication Before Surgery  Preoperative Medication Instructions   Antiplatelet or Anticoagulation Medication Instructions   - aspirin: Discontinue aspirin 7 days prior to procedure to reduce bleeding risk. It should be resumed postoperatively.     Additional Medication Instructions   - Herbal medications and vitamins: DO NOT TAKE from now until surgery.   - ACE/ARB/ARNI (lisinopril, enalapril, losartan, valsartan, olmesartan, sacubritril/valsartan) : DO NOT TAKE on day of surgery (minimum 11 hours for general anesthesia).   - Statins (atorvastatin, simvastatin, pravastatin) : Continue taking on the day of surgery.        Patient Education   Preparing for Your Surgery  For Adults  Getting started  In most cases, a nurse will call to review your health history and instructions. They will give you an arrival time based on your scheduled surgery time. Please be ready to share:  Your doctor's clinic name and phone number  Your medical, surgical, and anesthesia history  A list of allergies and sensitivities  A list of medicines, including herbal treatments and over-the-counter drugs  Whether the patient has a legal guardian (ask how to send us the papers in advance)  Note: You may not receive a call if you were seen at our PAC (Preoperative Assessment Center).  Please tell us if you're pregnant--or if there's any chance you might be pregnant. Some surgeries may injure a fetus (unborn baby), so they require a pregnancy test. Surgeries that are safe for a fetus don't always need a test, and you can choose whether to have one.   Preparing for surgery  Within 10 to 30 days of surgery: Have a pre-op exam (sometimes called an H&P, or History and Physical). This can be done at a clinic or pre-operative center.  If you're having a , you may not need this exam. Talk to your care team.  At your pre-op exam, talk to your care team about all medicines you take. (This includes CBD oil and any drugs, such as  THC, marijuana, and other forms of cannabis.) If you need to stop any medicine before surgery, ask when to start taking it again.  This is for your safety. Many medicines and drugs can make you bleed too much during surgery. Some change how well surgery (anesthesia) drugs work.  Call your insurance company to let them know you're having surgery. (If you don't have insurance, call [phone].)  Call your clinic if there's any change in your health. This includes a scrape or scratch near the surgery site, or any signs of a cold (sore throat, runny nose, cough, rash, fever).  Eating and drinking guidelines  For your safety: Unless your surgeon tells you otherwise, follow the guidelines below.  Eat and drink as normal until 8 hours before you arrive for surgery. After that, no food or milk. You can spit out gum when you arrive.  Drink clear liquids until 2 hours before you arrive. These are liquids you can see through, like water, Gatorade, and Propel Water. They also include plain black coffee and tea (no cream or milk).  No alcohol for 24 hours before you arrive. The night before surgery, stop any drinks that contain THC.  If your care team tells you to take medicine on the morning of surgery, it's okay to take it with a sip of water. No other medicines or drugs are allowed (including CBD oil)--follow your care team's instructions.  If you have questions the day of surgery, call your hospital or surgery center.   Preventing infection  Shower or bathe the night before and the morning of surgery. Follow the instructions your clinic gave you. (If no instructions, use regular soap.)  Don't shave or clip hair near your surgery site. We'll remove the hair if needed.  Don't smoke or vape the morning of surgery. No chewing tobacco for 6 hours before you arrive. A nicotine patch is okay. You may spit out nicotine gum when you arrive.  For some surgeries, the surgeon will tell you to fully quit smoking and nicotine.  We will  make every effort to keep you safe from infection. We will:  Clean our hands often with soap and water (or an alcohol-based hand rub).  Clean the skin at your surgery site with a special soap that kills germs.  Give you a special gown to keep you warm. (Cold raises the risk of infection.)  Wear hair covers, masks, gowns, and gloves during surgery.  Give antibiotic medicine, if prescribed. Not all surgeries need this medicine.  What to bring on the day of surgery  Photo ID and insurance card  Copy of your health care directive, if you have one  Glasses and hearing aids (bring cases)  You can't wear contacts during surgery  Inhaler and eye drops, if you use them (tell us about these when you arrive)  CPAP machine or breathing device, if you use them  A few personal items, if spending the night  If you have . . .  A pacemaker, ICD (cardiac defibrillator), or other implant: Bring the ID card.  An implanted stimulator: Bring the remote control.  A legal guardian: Bring a copy of the certified (court-stamped) guardianship papers.  Please remove any jewelry, including body piercings. Leave jewelry and other valuables at home.  If you're going home the day of surgery  You must have a responsible adult drive you home. They should stay with you overnight as well.  If you don't have someone to stay with you, and you aren't safe to go home alone, we may keep you overnight. Insurance often won't pay for this.  After surgery  If it's hard to control your pain or you need more pain medicine, please call your surgeon's office.  Questions?   If you have any questions for your care team, list them here:   ____________________________________________________________________________________________________________________________________________________________________________________________________________________________________________________________  For informational purposes only. Not to replace the advice of your health care  provider. Copyright   2003, 2019 [facility]. All rights reserved. Clinically reviewed by Gaston Dickey MD. [employer] [id number] - REV 08/24.

## 2025-01-21 NOTE — PROGRESS NOTES
Preoperative Evaluation  [facility]  [address]  [place] [address]  Phone: [phone]  Fax: [phone]  Primary Provider: Dariusz Flowers MD  Pre-op Performing Provider: Stevie Zimmer MD  Jan 21, 2025 1/21/2025   Surgical Information   What procedure is being done? pre op   Facility or Hospital where procedure/surgery will be performed: [facility]   Who is doing the procedure / surgery? dr hudson cabrera   Date of surgery / procedure: january 29 2025   Time of surgery / procedure: TBD   Where do you plan to recover after surgery? at home with family     Fax number for surgical facility: [phone]    Assessment & Plan     The proposed surgical procedure is considered INTERMEDIATE risk.    (Z01.818) Preop general physical exam  (primary encounter diagnosis)  Comment: It is a little difficult to assess if patient is able to tolerate greater than 4 METS of activity without any cardiopulmonary symptoms.  I suspect he is based on his history of being able to work out with weights for 90 minutes however not very long ago he was having difficulties walking a third of a mile and requiring nitro.  It looks like he is due for a repeat echocardiogram here in the next couple of weeks and his last stress test was 2 years ago.    I would like him to be evaluated by his cardiologist given that this surgery will require general anesthesia and is at least intermediate in risk.    Plan: Basic Metabolic Panel, CBC and Differential,         Hemoglobin, Adult Cardiology Eval [facility]         Referral    (M48.062) Spinal stenosis, lumbar region, with neurogenic claudication  Comment: Patient with symptoms consistent with spinal stenosis with neurogenic claudication.    (I50.40) Combined systolic and diastolic congestive heart failure, unspecified HF chronicity (H)  Comment: Currently patient appears euvolemic.  Echocardiogram has been stable with a EF around  40% over the past couple of years.    (I25.118) Coronary artery disease of native artery of native heart with stable angina pectoris  Comment: History of three-vessel CABG after nonspecific symptoms led him to pursue angiogram for probable PTCI.            - No identified additional risk factors other than previously addressed    Preoperative Medication Instructions  Antiplatelet or Anticoagulation Medication Instructions   - aspirin: Discontinue aspirin 7 days prior to procedure to reduce bleeding risk. It should be resumed postoperatively.     Additional Medication Instructions   - Herbal medications and vitamins: DO NOT TAKE from now until surgery.   - ACE/ARB/ARNI (lisinopril, enalapril, losartan, valsartan, olmesartan, sacubritril/valsartan) : DO NOT TAKE on day of surgery (minimum 11 hours for general anesthesia).   - Statins (atorvastatin, simvastatin, pravastatin) : Continue taking on the day of surgery.     Recommendation  Patient referred to cardiology for evaluation before surgery. Surgery approval pending completion of consultation.    Rivka Ramírez is a 79 year old, presenting for the following:  Pre-Op Exam          1/21/2025    11:39 AM   Additional Questions   Roomed by Chelsi MONTGOMERY   Accompanied by Spouse     HPI related to upcoming procedure: Patient comes in with his wife Cori for reoperative evaluation prior to undergoing lumbar laminectomy.  Patient tells me in 2016 he was having symptoms of nonspecific angina.  Went in for an angiogram expecting to have a stent placed but was found to have diffuse multivessel disease and required three-vessel CABG.  Subsequently has been noted to have combined systolic and diastolic heart failure with LVEF of 40%.  Also has left bundle branch block.  He last underwent a stress test in 2023 and an echocardiogram in February 2024.  The echo was reportedly stable per her visit in October but I do not have access to the formal read.  The stress test did note a  small equivocal area of reversible ischemia at the lateral aspect of the apex.    Patient tells me that within the past year he was having difficulty walking.  If he would go about 3/10 of a mile he would get chest tightness and pain consistent with previous angina.  He would use a nitro successfully.  More recently he started working out with weights.  He has not had any chest pain or shortness of breath with this activity.  He reports he can do this for about 90 minutes.    Patient does take all of his medications as prescribed including aspirin, simvastatin and lisinopril.    He is currently taking hydrocodone for low back pain.  He reports the pain is a sharp pain in his left buttocks about the size of a silver dollar.  It will radiate down his lateral hip and into his calf.  Is also had numbness and pins-and-needles in his calf as well as the bottom of his foot which she describes as uncomfortable.    He has had symptoms in the right leg also.  That seems more intermittent but when he does have the pain it is exactly as described above on the left.    Despite this he is sleeping reasonably well.  Pain is much worse when he is up standing especially standing straight, seems to be better with sitting or laying down.  He uses ice and heat constantly.  He has also been using hydrocodone as noted above with good relief and no side effect other than some intermittent constipation relieved with magnesium.    Has not had any fevers or chills.  No dysuria or polyuria.  No hematuria.  Other than some constipation no bowel changes.  No black or bloody stools.  No diarrhea.  He has not had any sore throat, cough or other respiratory symptoms.          1/21/2025   Pre-Op Questionnaire   Have you ever had a heart attack or stroke? (!) YES    Have you ever had surgery on your heart or blood vessels, such as a stent placement, a coronary artery bypass, or surgery on an artery in your head, neck, heart, or legs? (!) YES    Do  you have chest pain with activity? No   Do you have a history of heart failure? (!) YES    Do you currently have a cold, bronchitis or symptoms of other infection? No   Do you have a cough, shortness of breath, or wheezing? No   Do you or anyone in your family have previous history of blood clots? (!) UNKNOWN    Do you or does anyone in your family have a serious bleeding problem such as prolonged bleeding following surgeries or cuts? No   Have you ever had problems with anemia or been told to take iron pills? No   Have you had any abnormal blood loss such as black, tarry or bloody stools? No   Have you ever had a blood transfusion? No   Are you willing to have a blood transfusion if it is medically needed before, during, or after your surgery? Yes   Have you or any of your relatives ever had problems with anesthesia? No   Do you have sleep apnea, excessive snoring or daytime drowsiness? No   Do you have any artifical heart valves or other implanted medical devices like a pacemaker, defibrillator, or continuous glucose monitor? No   Do you have artificial joints? No   Are you allergic to latex? No     Health Care Directive  Patient has a Health Care Directive on file      Preoperative Review of    reviewed - no record of controlled substances prescribed.          Patient Active Problem List    Diagnosis Date Noted    Combined systolic and diastolic congestive heart failure, unspecified HF chronicity (H) 01/20/2025     Priority: Medium    Spinal stenosis, lumbar region, with neurogenic claudication 09/27/2024     Priority: Medium    Lumbar disc herniation 09/18/2024     Priority: Medium    Chronic left-sided low back pain with sciatica 09/18/2019     Priority: Medium    Cerebrovascular accident (CVA) due to stenosis of precerebral artery (H) 03/12/2019     Priority: Medium     Collapse of small Vessel in back of his head (per his report)      Age-related nuclear cataract 02/20/2019     Priority: Medium    Acute  bilateral low back pain with sciatica 09/26/2018     Priority: Medium    ACP (advance care planning) 06/08/2018     Priority: Medium    Atherosclerotic heart disease 01/17/2018     Priority: Medium    Hyperlipidemia 01/17/2018     Priority: Medium    Migraine headache 01/17/2018     Priority: Medium    Chronic systolic congestive heart failure (H) 11/07/2016     Priority: Medium    Coronary artery disease involving coronary bypass graft of native heart without angina pectoris 10/26/2016     Priority: Medium     Overview:   Three-vessel      Coronary artery disease of native artery of native heart with stable angina pectoris 10/10/2016     Priority: Medium    Cerebral infarction (H) 02/21/2014     Priority: Medium    Slowing of urinary stream 02/21/2014     Priority: Medium      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 angina pectoris     1996,Stents times 3    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     Past Surgical History:   Procedure Laterality Date    ANGIOPLASTY      9/96, 1996~1997,and angioplasty    COLONOSCOPY  06/04/2009 6/4/09    COLONOSCOPY      1997 Nov    HEART CATH, ANGIOPLASTY      9/96, 1996~1997,x3    HEART CATH, ANGIOPLASTY      1996    OTHER SURGICAL HISTORY      9/96, 1996~1997,[id number],SCAN-ANGIOGRAM,Coronary angiography    OTHER SURGICAL HISTORY      1996,[id number],SCAN-ANGIOGRAM,Coronary arteriography, follow-up angiography in Nov    OTHER SURGICAL HISTORY      10/10/2016,[id number],OTHER,N/A,[facility]    VASECTOMY      1982     Current Outpatient Medications   Medication Sig Dispense Refill    acetaminophen (TYLENOL) 325 MG tablet Take 650 mg by mouth every 4 hours as needed      aspirin 81 MG chewable tablet Take 81 mg by mouth daily      atorvastatin (LIPITOR) 10 MG tablet Take 10 mg by mouth daily.      atorvastatin (LIPITOR) 20 MG tablet Take 20 mg by mouth daily      HYDROcodone-acetaminophen (NORCO)  "5-325 MG tablet Take 1 tablet by mouth every 6 hours as needed for severe pain. 60 tablet 0    lisinopril (ZESTRIL) 5 MG tablet Take 1 tablet by mouth daily at 2 pm      magnesium 250 MG tablet Take 1 tablet by mouth daily      metoprolol succinate (TOPROL-XL) 25 MG 24 hr tablet Take 1 tablet by mouth daily      Multiple Vitamin (MULTI-VITAMINS) TABS Take 1 tablet by mouth daily      nitroGLYcerin (NITROSTAT) 0.4 MG sublingual tablet Place 1 tablet (0.4 mg) under the tongue every 5 minutes as needed for chest pain 25 tablet 0    predniSONE (DELTASONE) 20 MG tablet Take 3 tabs by mouth daily x 3 days, then 2 tabs daily x 3 days, then 1 tab daily x 3 days, then 1/2 tab daily x 3 days. 20 tablet 0    Selenium 200 MCG TBCR       spironolactone (ALDACTONE) 25 MG tablet Take 12.5 mg by mouth daily (Patient not taking: Reported on 2025)      triamcinolone (KENALOG) 0.1 % external cream Apply topically 2 times daily 453.6 g 3    vitamin B-12 (CYANOCOBALAMIN) 1000 MCG CR tablet Take 1 tablet by mouth daily         No Known Allergies     Social History     Tobacco Use    Smoking status: Former     Current packs/day: 0.00     Types: Cigarettes     Quit date: 1977     Years since quittin.9    Smokeless tobacco: Never   Substance Use Topics    Alcohol use: No     Alcohol/week: 0.0 standard drinks of alcohol       History   Drug Use No               Objective    /63 (BP Location: Right arm, Patient Position: Sitting, Cuff Size: Adult Regular)   Pulse 64   Temp 97.6  F (36.4  C) (Tympanic)   Resp 16   Ht 1.753 m (5' 9\")   Wt 69.1 kg (152 lb 6.4 oz)   SpO2 98%   BMI 22.51 kg/m     Estimated body mass index is 22.51 kg/m  as calculated from the following:    Height as of this encounter: 1.753 m (5' 9\").    Weight as of this encounter: 69.1 kg (152 lb 6.4 oz).  Physical Exam  GENERAL: alert and no distress  EYES: Eyes grossly normal to inspection, PERRL and conjunctivae and sclerae normal  HENT: ear canals " and TM's normal, nose and mouth without ulcers or lesions  NECK: no adenopathy, no asymmetry, masses, or scars  RESP: lungs clear to auscultation - no rales, rhonchi or wheezes  CV: RRR.  No murmurs rubs or gallops.  No edema.  ABDOMEN: ASNT  MS: No synovitis.  4+ out of 5 strength in plantarflexion bilaterally.  He has normal dorsiflexion, knee extension and flexion and walks with a measured and slow but normal gait.  SKIN: no suspicious lesions or rashes  NEURO: Reflexes are symmetric.  Normal tone.  Mentation intact.  PSYCH: mentation appears normal, affect normal/bright      Diagnostics  Recent Results (from the past 720 hours)   Basic Metabolic Panel    Collection Time: 01/21/25 12:36 PM   Result Value Ref Range    Sodium 139 135 - 145 mmol/L    Potassium 4.6 3.4 - 5.3 mmol/L    Chloride 102 98 - 107 mmol/L    Carbon Dioxide (CO2) 27 22 - 29 mmol/L    Anion Gap 10 7 - 15 mmol/L    Urea Nitrogen 17.4 8.0 - 23.0 mg/dL    Creatinine 1.07 0.67 - 1.17 mg/dL    GFR Estimate 70 >60 mL/min/1.73m2    Calcium 9.1 8.8 - 10.4 mg/dL    Glucose 93 70 - 99 mg/dL   CBC with platelets and differential    Collection Time: 01/21/25 12:36 PM   Result Value Ref Range    WBC Count 7.1 4.0 - 11.0 10e3/uL    RBC Count 4.76 4.40 - 5.90 10e6/uL    Hemoglobin 14.9 13.3 - 17.7 g/dL    Hematocrit 44.5 40.0 - 53.0 %    MCV 94 78 - 100 fL    MCH 31.3 26.5 - 33.0 pg    MCHC 33.5 31.5 - 36.5 g/dL    RDW 11.9 10.0 - 15.0 %    Platelet Count 179 150 - 450 10e3/uL    % Neutrophils 59 %    % Lymphocytes 28 %    % Monocytes 11 %    % Eosinophils 1 %    % Basophils 1 %    % Immature Granulocytes 0 %    NRBCs per 100 WBC 0 <1 /100    Absolute Neutrophils 4.1 1.6 - 8.3 10e3/uL    Absolute Lymphocytes 2.0 0.8 - 5.3 10e3/uL    Absolute Monocytes 0.8 0.0 - 1.3 10e3/uL    Absolute Eosinophils 0.1 0.0 - 0.7 10e3/uL    Absolute Basophils 0.1 0.0 - 0.2 10e3/uL    Absolute Immature Granulocytes 0.0 <=0.4 10e3/uL    Absolute NRBCs 0.0 10e3/uL      EKG, ECHO  and/or stress test to be ordered by cardiology department    Revised Cardiac Risk Index (RCRI)  The patient has the following serious cardiovascular risks for perioperative complications:   - Coronary Artery Disease (MI, positive stress test, angina, Qs on EKG) = 1 point   - Congestive Heart Failure (pulmonary edema, PND, s3 charlene, CXR with pulmonary congestion, basilar rales) = 1 point   - Cerebrovascular Disease (TIA or CVA) = 1 point     RCRI Interpretation: 3 points: Class IV (high risk - >11% complication rate)    Estimated Functional Capacity: UNCLEAR if he can perform 4 METS without symptoms           Signed Electronically by: Stevie Zimmer MD  A copy of this evaluation report is provided to the requesting physician.

## 2025-01-29 ENCOUNTER — TRANSFERRED RECORDS (OUTPATIENT)
Dept: HEALTH INFORMATION MANAGEMENT | Facility: OTHER | Age: 80
End: 2025-01-29
Payer: COMMERCIAL

## 2025-02-27 ENCOUNTER — TRANSFERRED RECORDS (OUTPATIENT)
Dept: HEALTH INFORMATION MANAGEMENT | Facility: OTHER | Age: 80
End: 2025-02-27
Payer: COMMERCIAL

## 2025-03-17 ENCOUNTER — NURSE TRIAGE (OUTPATIENT)
Dept: FAMILY MEDICINE | Facility: OTHER | Age: 80
End: 2025-03-17
Payer: COMMERCIAL

## 2025-03-17 NOTE — TELEPHONE ENCOUNTER
"Advised patient to present to ER per protocol. Patient states he called for an appointment with Dr. Flowers. Patient has a Cardiologist at [facility] but has not called that office yet. Patient states he does not think his symptoms warrant ER at this time but agrees to call Dr. Kim's office now. Phone number provided. Emily Ocampo RN on 3/17/2025 at 2:49 PM       Reason for Disposition   Chest pain or 'angina' comes and goes and is happening more often (increasing in frequency) or getting worse (increasing in severity) (Exception: Chest pains that last only a few seconds.)    Answer Assessment - Initial Assessment Questions  1. LOCATION: \"Where does it hurt?\"        Across the chest     2. RADIATION: \"Does the pain go anywhere else?\" (e.g., into neck, jaw, arms, back)      Denies     3. ONSET: \"When did the chest pain begin?\" (Minutes, hours or days)       Gets tight across the chest when breathing cold air, with exertion    4. PATTERN: \"Does the pain come and go, or has it been constant since it started?\"  \"Does it get worse with exertion?\"       Transient     5. DURATION: \"How long does it last\" (e.g., seconds, minutes, hours)      Would continue quite a while if doesn't take a nitroglycerin     6. SEVERITY: \"How bad is the pain?\"  (e.g., Scale 1-10; mild, moderate, or severe)     - MILD (1-3): doesn't interfere with normal activities      - MODERATE (4-7): interferes with normal activities or awakens from sleep     - SEVERE (8-10): excruciating pain, unable to do any normal activities        4-5 / moderate     7. CARDIAC RISK FACTORS: \"Do you have any history of heart problems or risk factors for heart disease?\" (e.g., angina, prior heart attack; diabetes, high blood pressure, high cholesterol, smoker, or strong family history of heart disease)      History of stents     8. PULMONARY RISK FACTORS: \"Do you have any history of lung disease?\"  (e.g., blood clots in lung, asthma, emphysema, birth " "control pills)      Denies     9. CAUSE: \"What do you think is causing the chest pain?\"       Cardiac     10. OTHER SYMPTOMS: \"Do you have any other symptoms?\" (e.g., dizziness, nausea, vomiting, sweating, fever, difficulty breathing, cough)        Cough can happen 10-15 minutes later      11. PREGNANCY: \"Is there any chance you are pregnant?\" \"When was your last menstrual period?\"        No    Protocols used: Chest Pain-A-[place]    "

## 2025-07-19 ENCOUNTER — HEALTH MAINTENANCE LETTER (OUTPATIENT)
Age: 80
End: 2025-07-19

## (undated) RX ORDER — DEXAMETHASONE SODIUM PHOSPHATE 10 MG/ML
INJECTION, SOLUTION INTRAMUSCULAR; INTRAVENOUS
Status: DISPENSED
Start: 2019-10-09

## (undated) RX ORDER — DEXAMETHASONE SODIUM PHOSPHATE 10 MG/ML
INJECTION, SOLUTION INTRAMUSCULAR; INTRAVENOUS
Status: DISPENSED
Start: 2024-10-14

## (undated) RX ORDER — LIDOCAINE HYDROCHLORIDE 10 MG/ML
INJECTION, SOLUTION EPIDURAL; INFILTRATION; INTRACAUDAL; PERINEURAL
Status: DISPENSED
Start: 2024-10-14

## (undated) RX ORDER — LIDOCAINE HYDROCHLORIDE 10 MG/ML
INJECTION, SOLUTION INFILTRATION; PERINEURAL
Status: DISPENSED
Start: 2019-10-09

## (undated) RX ORDER — HYDROCODONE BITARTRATE AND ACETAMINOPHEN 5; 325 MG/1; MG/1
TABLET ORAL
Status: DISPENSED
Start: 2025-01-09

## (undated) RX ORDER — LIDOCAINE HYDROCHLORIDE 10 MG/ML
INJECTION, SOLUTION EPIDURAL; INFILTRATION; INTRACAUDAL; PERINEURAL
Status: DISPENSED
Start: 2019-10-09

## (undated) RX ORDER — LIDOCAINE HYDROCHLORIDE 10 MG/ML
INJECTION, SOLUTION INFILTRATION; PERINEURAL
Status: DISPENSED
Start: 2024-10-14

## (undated) RX ORDER — METHYLPREDNISOLONE ACETATE 80 MG/ML
INJECTION, SUSPENSION INTRA-ARTICULAR; INTRALESIONAL; INTRAMUSCULAR; SOFT TISSUE
Status: DISPENSED
Start: 2019-10-09

## (undated) RX ORDER — LIDOCAINE 4 G/G
PATCH TOPICAL
Status: DISPENSED
Start: 2025-01-09